# Patient Record
Sex: FEMALE | Race: OTHER | HISPANIC OR LATINO | Employment: UNEMPLOYED | URBAN - METROPOLITAN AREA
[De-identification: names, ages, dates, MRNs, and addresses within clinical notes are randomized per-mention and may not be internally consistent; named-entity substitution may affect disease eponyms.]

---

## 2017-10-21 ENCOUNTER — HOSPITAL ENCOUNTER (INPATIENT)
Facility: HOSPITAL | Age: 82
LOS: 6 days | Discharge: NURSING, REHAB OR LONG-TERM CARE | DRG: 372 | End: 2017-10-27
Attending: EMERGENCY MEDICINE | Admitting: INTERNAL MEDICINE
Payer: MEDICARE

## 2017-10-21 ENCOUNTER — APPOINTMENT (INPATIENT)
Dept: RADIOLOGY | Facility: HOSPITAL | Age: 82
DRG: 372 | End: 2017-10-21
Payer: MEDICARE

## 2017-10-21 DIAGNOSIS — K92.2 GI BLEED: Primary | ICD-10-CM

## 2017-10-21 DIAGNOSIS — K52.9 COLITIS: ICD-10-CM

## 2017-10-21 DIAGNOSIS — F03.90 DEMENTIA WITHOUT BEHAVIORAL DISTURBANCE, UNSPECIFIED DEMENTIA TYPE (HCC): Chronic | ICD-10-CM

## 2017-10-21 DIAGNOSIS — K92.1 MELENA: ICD-10-CM

## 2017-10-21 DIAGNOSIS — K55.9 MESENTERIC ISCHEMIA (HCC): ICD-10-CM

## 2017-10-21 PROBLEM — R73.9 HYPERGLYCEMIA: Status: ACTIVE | Noted: 2017-10-21

## 2017-10-21 PROBLEM — D72.829 LEUKOCYTOSIS: Status: ACTIVE | Noted: 2017-10-21

## 2017-10-21 PROBLEM — I10 ESSENTIAL HYPERTENSION: Chronic | Status: ACTIVE | Noted: 2017-10-21

## 2017-10-21 PROBLEM — R33.9 URINARY RETENTION: Status: ACTIVE | Noted: 2017-10-21

## 2017-10-21 PROBLEM — R55 SYNCOPE: Status: ACTIVE | Noted: 2017-10-21

## 2017-10-21 PROBLEM — D64.9 ANEMIA: Status: ACTIVE | Noted: 2017-10-21

## 2017-10-21 PROBLEM — E87.2 LACTIC ACID ACIDOSIS: Status: ACTIVE | Noted: 2017-10-21

## 2017-10-21 PROBLEM — E03.9 HYPOTHYROIDISM: Chronic | Status: ACTIVE | Noted: 2017-10-21

## 2017-10-21 LAB
ABO GROUP BLD: NORMAL
ALBUMIN SERPL BCP-MCNC: 3.7 G/DL (ref 3.5–5)
ALP SERPL-CCNC: 106 U/L (ref 46–116)
ALT SERPL W P-5'-P-CCNC: 38 U/L (ref 12–78)
ANION GAP BLD CALC-SCNC: 13 MMOL/L (ref 4–13)
ANION GAP SERPL CALCULATED.3IONS-SCNC: 11 MMOL/L (ref 4–13)
ANISOCYTOSIS BLD QL SMEAR: PRESENT
AST SERPL W P-5'-P-CCNC: 33 U/L (ref 5–45)
ATRIAL RATE: 63 BPM
ATRIAL RATE: 65 BPM
BASE EXCESS BLDA CALC-SCNC: -3 MMOL/L (ref -2–3)
BASOPHILS # BLD AUTO: 0.01 THOUSANDS/ΜL (ref 0–0.1)
BASOPHILS # BLD MANUAL: 0 THOUSAND/UL (ref 0–0.1)
BASOPHILS NFR BLD AUTO: 0 % (ref 0–1)
BASOPHILS NFR MAR MANUAL: 0 % (ref 0–1)
BILIRUB SERPL-MCNC: 0.52 MG/DL (ref 0.2–1)
BLD GP AB SCN SERPL QL: NEGATIVE
BUN BLD-MCNC: 23 MG/DL (ref 5–25)
BUN SERPL-MCNC: 18 MG/DL (ref 5–25)
CA-I BLD-SCNC: 1.13 MMOL/L (ref 1.12–1.32)
CA-I BLD-SCNC: 1.15 MMOL/L (ref 1.12–1.32)
CALCIUM SERPL-MCNC: 8.8 MG/DL (ref 8.3–10.1)
CHLORIDE BLD-SCNC: 99 MMOL/L (ref 100–108)
CHLORIDE SERPL-SCNC: 99 MMOL/L (ref 100–108)
CO2 SERPL-SCNC: 22 MMOL/L (ref 21–32)
CREAT BLD-MCNC: 0.8 MG/DL (ref 0.6–1.3)
CREAT SERPL-MCNC: 1 MG/DL (ref 0.6–1.3)
EOSINOPHIL # BLD AUTO: 0.03 THOUSAND/ΜL (ref 0–0.61)
EOSINOPHIL # BLD MANUAL: 0 THOUSAND/UL (ref 0–0.4)
EOSINOPHIL NFR BLD AUTO: 0 % (ref 0–6)
EOSINOPHIL NFR BLD MANUAL: 0 % (ref 0–6)
ERYTHROCYTE [DISTWIDTH] IN BLOOD BY AUTOMATED COUNT: 13.3 % (ref 11.6–15.1)
ERYTHROCYTE [DISTWIDTH] IN BLOOD BY AUTOMATED COUNT: 13.3 % (ref 11.6–15.1)
GFR SERPL CREATININE-BSD FRML MDRD: 53 ML/MIN/1.73SQ M
GFR SERPL CREATININE-BSD FRML MDRD: 69 ML/MIN/1.73SQ M
GLUCOSE SERPL-MCNC: 223 MG/DL (ref 65–140)
GLUCOSE SERPL-MCNC: 231 MG/DL (ref 65–140)
GLUCOSE SERPL-MCNC: 233 MG/DL (ref 65–140)
HCO3 BLDA-SCNC: 22.3 MMOL/L (ref 24–30)
HCT VFR BLD AUTO: 33.6 % (ref 34.8–46.1)
HCT VFR BLD AUTO: 37.9 % (ref 34.8–46.1)
HCT VFR BLD AUTO: 39.1 % (ref 34.8–46.1)
HCT VFR BLD CALC: 38 % (ref 34.8–46.1)
HCT VFR BLD CALC: 40 % (ref 34.8–46.1)
HGB BLD-MCNC: 11.3 G/DL (ref 11.5–15.4)
HGB BLD-MCNC: 12.8 G/DL (ref 11.5–15.4)
HGB BLD-MCNC: 13.4 G/DL (ref 11.5–15.4)
HGB BLDA-MCNC: 12.9 G/DL (ref 11.5–15.4)
HGB BLDA-MCNC: 13.6 G/DL (ref 11.5–15.4)
LACTATE SERPL-SCNC: 2.8 MMOL/L (ref 0.5–2)
LACTATE SERPL-SCNC: 3.7 MMOL/L (ref 0.5–2)
LYMPHOCYTES # BLD AUTO: 0.67 THOUSAND/UL (ref 0.6–4.47)
LYMPHOCYTES # BLD AUTO: 1.99 THOUSANDS/ΜL (ref 0.6–4.47)
LYMPHOCYTES # BLD AUTO: 3 % (ref 14–44)
LYMPHOCYTES NFR BLD AUTO: 12 % (ref 14–44)
MACROCYTES BLD QL AUTO: PRESENT
MCH RBC QN AUTO: 31.6 PG (ref 26.8–34.3)
MCH RBC QN AUTO: 31.8 PG (ref 26.8–34.3)
MCHC RBC AUTO-ENTMCNC: 33.6 G/DL (ref 31.4–37.4)
MCHC RBC AUTO-ENTMCNC: 33.8 G/DL (ref 31.4–37.4)
MCV RBC AUTO: 94 FL (ref 82–98)
MCV RBC AUTO: 94 FL (ref 82–98)
MONOCYTES # BLD AUTO: 1.35 THOUSAND/ΜL (ref 0.17–1.22)
MONOCYTES # BLD AUTO: 1.57 THOUSAND/UL (ref 0–1.22)
MONOCYTES NFR BLD AUTO: 8 % (ref 4–12)
MONOCYTES NFR BLD: 7 % (ref 4–12)
NEUTROPHILS # BLD AUTO: 12.87 THOUSANDS/ΜL (ref 1.85–7.62)
NEUTROPHILS # BLD MANUAL: 19.76 THOUSAND/UL (ref 1.85–7.62)
NEUTS SEG NFR BLD AUTO: 80 % (ref 43–75)
NEUTS SEG NFR BLD AUTO: 88 % (ref 43–75)
NRBC BLD AUTO-RTO: 0 /100 WBCS
NRBC BLD AUTO-RTO: 0 /100 WBCS
P AXIS: 63 DEGREES
P AXIS: 79 DEGREES
PCO2 BLD: 24 MMOL/L (ref 21–32)
PCO2 BLD: 24 MMOL/L (ref 21–32)
PCO2 BLD: 41.1 MM HG (ref 42–50)
PH BLD: 7.34 [PH] (ref 7.3–7.4)
PLATELET # BLD AUTO: 251 THOUSANDS/UL (ref 149–390)
PLATELET # BLD AUTO: 311 THOUSANDS/UL (ref 149–390)
PLATELET BLD QL SMEAR: ADEQUATE
PMV BLD AUTO: 8.8 FL (ref 8.9–12.7)
PMV BLD AUTO: 9.2 FL (ref 8.9–12.7)
PO2 BLD: 196 MM HG (ref 35–45)
POIKILOCYTOSIS BLD QL SMEAR: PRESENT
POTASSIUM BLD-SCNC: 3.7 MMOL/L (ref 3.5–5.3)
POTASSIUM BLD-SCNC: 3.8 MMOL/L (ref 3.5–5.3)
POTASSIUM SERPL-SCNC: 3.6 MMOL/L (ref 3.5–5.3)
PR INTERVAL: 166 MS
PR INTERVAL: 170 MS
PROT SERPL-MCNC: 7.7 G/DL (ref 6.4–8.2)
QRS AXIS: 71 DEGREES
QRS AXIS: 72 DEGREES
QRSD INTERVAL: 66 MS
QRSD INTERVAL: 74 MS
QT INTERVAL: 434 MS
QT INTERVAL: 434 MS
QTC INTERVAL: 444 MS
QTC INTERVAL: 451 MS
RBC # BLD AUTO: 3.58 MILLION/UL (ref 3.81–5.12)
RBC # BLD AUTO: 4.02 MILLION/UL (ref 3.81–5.12)
RBC MORPH BLD: PRESENT
RH BLD: POSITIVE
SAO2 % BLD FROM PO2: 100 % (ref 95–98)
SODIUM BLD-SCNC: 131 MMOL/L (ref 136–145)
SODIUM BLD-SCNC: 131 MMOL/L (ref 136–145)
SODIUM SERPL-SCNC: 132 MMOL/L (ref 136–145)
SPECIMEN EXPIRATION DATE: NORMAL
SPECIMEN SOURCE: ABNORMAL
SPECIMEN SOURCE: ABNORMAL
SPECIMEN SOURCE: NORMAL
T WAVE AXIS: 88 DEGREES
T WAVE AXIS: 96 DEGREES
TROPONIN I BLD-MCNC: 0 NG/ML (ref 0–0.08)
VARIANT LYMPHS # BLD AUTO: 2 %
VENTRICULAR RATE: 63 BPM
VENTRICULAR RATE: 65 BPM
WBC # BLD AUTO: 16.3 THOUSAND/UL (ref 4.31–10.16)
WBC # BLD AUTO: 22.46 THOUSAND/UL (ref 4.31–10.16)

## 2017-10-21 PROCEDURE — 85007 BL SMEAR W/DIFF WBC COUNT: CPT | Performed by: EMERGENCY MEDICINE

## 2017-10-21 PROCEDURE — 84295 ASSAY OF SERUM SODIUM: CPT

## 2017-10-21 PROCEDURE — 36415 COLL VENOUS BLD VENIPUNCTURE: CPT | Performed by: EMERGENCY MEDICINE

## 2017-10-21 PROCEDURE — 74177 CT ABD & PELVIS W/CONTRAST: CPT

## 2017-10-21 PROCEDURE — 96360 HYDRATION IV INFUSION INIT: CPT

## 2017-10-21 PROCEDURE — 93005 ELECTROCARDIOGRAM TRACING: CPT

## 2017-10-21 PROCEDURE — 86901 BLOOD TYPING SEROLOGIC RH(D): CPT | Performed by: EMERGENCY MEDICINE

## 2017-10-21 PROCEDURE — 86900 BLOOD TYPING SEROLOGIC ABO: CPT | Performed by: EMERGENCY MEDICINE

## 2017-10-21 PROCEDURE — 85025 COMPLETE CBC W/AUTO DIFF WBC: CPT | Performed by: EMERGENCY MEDICINE

## 2017-10-21 PROCEDURE — 85014 HEMATOCRIT: CPT

## 2017-10-21 PROCEDURE — 99285 EMERGENCY DEPT VISIT HI MDM: CPT

## 2017-10-21 PROCEDURE — 96361 HYDRATE IV INFUSION ADD-ON: CPT

## 2017-10-21 PROCEDURE — 86850 RBC ANTIBODY SCREEN: CPT | Performed by: EMERGENCY MEDICINE

## 2017-10-21 PROCEDURE — 82803 BLOOD GASES ANY COMBINATION: CPT

## 2017-10-21 PROCEDURE — 93005 ELECTROCARDIOGRAM TRACING: CPT | Performed by: EMERGENCY MEDICINE

## 2017-10-21 PROCEDURE — 80047 BASIC METABLC PNL IONIZED CA: CPT

## 2017-10-21 PROCEDURE — 82330 ASSAY OF CALCIUM: CPT

## 2017-10-21 PROCEDURE — 85014 HEMATOCRIT: CPT | Performed by: INTERNAL MEDICINE

## 2017-10-21 PROCEDURE — 85018 HEMOGLOBIN: CPT | Performed by: INTERNAL MEDICINE

## 2017-10-21 PROCEDURE — C9113 INJ PANTOPRAZOLE SODIUM, VIA: HCPCS | Performed by: EMERGENCY MEDICINE

## 2017-10-21 PROCEDURE — 84132 ASSAY OF SERUM POTASSIUM: CPT

## 2017-10-21 PROCEDURE — 84484 ASSAY OF TROPONIN QUANT: CPT

## 2017-10-21 PROCEDURE — 85027 COMPLETE CBC AUTOMATED: CPT | Performed by: EMERGENCY MEDICINE

## 2017-10-21 PROCEDURE — 83605 ASSAY OF LACTIC ACID: CPT | Performed by: EMERGENCY MEDICINE

## 2017-10-21 PROCEDURE — 82947 ASSAY GLUCOSE BLOOD QUANT: CPT

## 2017-10-21 PROCEDURE — 80053 COMPREHEN METABOLIC PANEL: CPT | Performed by: EMERGENCY MEDICINE

## 2017-10-21 RX ORDER — AMLODIPINE BESYLATE 5 MG/1
5 TABLET ORAL DAILY
COMMUNITY

## 2017-10-21 RX ORDER — ONDANSETRON 2 MG/ML
4 INJECTION INTRAMUSCULAR; INTRAVENOUS EVERY 6 HOURS PRN
Status: DISCONTINUED | OUTPATIENT
Start: 2017-10-21 | End: 2017-10-26

## 2017-10-21 RX ORDER — PANTOPRAZOLE SODIUM 40 MG/1
40 INJECTION, POWDER, FOR SOLUTION INTRAVENOUS
Status: DISCONTINUED | OUTPATIENT
Start: 2017-10-22 | End: 2017-10-26

## 2017-10-21 RX ORDER — LEVOTHYROXINE SODIUM 88 UG/1
88 TABLET ORAL
Status: DISCONTINUED | OUTPATIENT
Start: 2017-10-22 | End: 2017-10-27 | Stop reason: HOSPADM

## 2017-10-21 RX ORDER — AMLODIPINE AND VALSARTAN 5; 320 MG/1; MG/1
1 TABLET ORAL DAILY
Status: DISCONTINUED | OUTPATIENT
Start: 2017-10-22 | End: 2017-10-21 | Stop reason: CLARIF

## 2017-10-21 RX ORDER — SODIUM CHLORIDE 9 MG/ML
125 INJECTION, SOLUTION INTRAVENOUS CONTINUOUS
Status: DISCONTINUED | OUTPATIENT
Start: 2017-10-21 | End: 2017-10-26

## 2017-10-21 RX ORDER — LEVOTHYROXINE SODIUM 88 UG/1
88 TABLET ORAL DAILY
COMMUNITY

## 2017-10-21 RX ORDER — AMLODIPINE BESYLATE 5 MG/1
5 TABLET ORAL DAILY
Status: DISCONTINUED | OUTPATIENT
Start: 2017-10-22 | End: 2017-10-27 | Stop reason: HOSPADM

## 2017-10-21 RX ORDER — IRBESARTAN 300 MG/1
300 TABLET ORAL
COMMUNITY

## 2017-10-21 RX ORDER — VALSARTAN 160 MG/1
320 TABLET ORAL DAILY
Status: DISCONTINUED | OUTPATIENT
Start: 2017-10-22 | End: 2017-10-27 | Stop reason: HOSPADM

## 2017-10-21 RX ORDER — PANTOPRAZOLE SODIUM 40 MG/1
40 INJECTION, POWDER, FOR SOLUTION INTRAVENOUS ONCE
Status: DISCONTINUED | OUTPATIENT
Start: 2017-10-21 | End: 2017-10-21

## 2017-10-21 RX ADMIN — PIPERACILLIN SODIUM AND TAZOBACTAM SODIUM 3.38 G: 36; 4.5 INJECTION, POWDER, FOR SOLUTION INTRAVENOUS at 23:54

## 2017-10-21 RX ADMIN — IOHEXOL 100 ML: 350 INJECTION, SOLUTION INTRAVENOUS at 20:58

## 2017-10-21 RX ADMIN — SODIUM CHLORIDE 1000 ML: 0.9 INJECTION, SOLUTION INTRAVENOUS at 16:08

## 2017-10-21 RX ADMIN — POLYETHYLENE GLYCOL 3350, SODIUM SULFATE ANHYDROUS, SODIUM BICARBONATE, SODIUM CHLORIDE, POTASSIUM CHLORIDE 4000 ML: 236; 22.74; 6.74; 5.86; 2.97 POWDER, FOR SOLUTION ORAL at 19:20

## 2017-10-21 RX ADMIN — ONDANSETRON 4 MG: 2 INJECTION INTRAMUSCULAR; INTRAVENOUS at 20:27

## 2017-10-21 RX ADMIN — SODIUM CHLORIDE 1000 ML: 0.9 INJECTION, SOLUTION INTRAVENOUS at 18:00

## 2017-10-21 RX ADMIN — SODIUM CHLORIDE 80 MG: 9 INJECTION, SOLUTION INTRAVENOUS at 18:35

## 2017-10-21 RX ADMIN — SODIUM CHLORIDE 100 ML/HR: 0.9 INJECTION, SOLUTION INTRAVENOUS at 23:08

## 2017-10-21 RX ADMIN — SODIUM CHLORIDE 8 MG/HR: 9 INJECTION, SOLUTION INTRAVENOUS at 18:09

## 2017-10-21 RX ADMIN — SODIUM CHLORIDE, SODIUM LACTATE, POTASSIUM CHLORIDE, AND CALCIUM CHLORIDE 1000 ML: .6; .31; .03; .02 INJECTION, SOLUTION INTRAVENOUS at 23:13

## 2017-10-21 NOTE — CONSULTS
Consultation - [facility]  Bradly Dusty 80 y o  female MRN: [id number]  Unit/Bed#: ED 29 Encounter: [id number]        Consults    Reason for Consult / Principal Problem:  hematochezia    HPI:  71-year-old lady who presents to the hospital after complaining of lightheadedness dizziness and presyncopal episode at home  On the way to the hospital she felt like she was having a bowel movement  She was brought in by EMS  She was hypertensive  When she came to the emergency room she did have a large amount of hematochezia  She was given a bolus  Her blood pressure in the emergency room was 130 over 50s  Her heart rate was in the 60s to 70s  She has been on antihypertensives  She does not take any anticoagulation  She did not take any NSAIDs  She did complain of abdominal bloating and discomfort and cramping  That resolved with having a bowel movement  She has had 3 episodes of hematochezia in the hospital   She has had no nausea vomiting  Her son does note that she had an episode of coffee-ground emesis in [facility] about 6 months ago  She just came back from [facility] 2 weeks ago  Over that she also had an endoscopy and colonoscopy some point during the year  Did not recall the results  The patient is a poor historian secondary to dementia  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 ursodiol some years ago  She has not been on this recently  REVIEW OF SYSTEMS:     CONSTITUTIONAL:  Complains of weakness  HEENT: No earache or tinnitus  Denies hearing loss or visual disturbances  CARDIOVASCULAR: No chest pain or palpitations  RESPIRATORY: Denies any cough, hemoptysis, shortness of breath or dyspnea on exertion  GASTROINTESTINAL: As noted in the History of Present Illness  GENITOURINARY: No problems with urination  Denies any hematuria or dysuria  NEUROLOGIC: No dizziness or vertigo, denies headaches  MUSCULOSKELETAL: Denies any muscle or joint pain     SKIN: Denies skin rashes or itching  ENDOCRINE: Denies excessive thirst  Denies intolerance to heat or cold  PSYCHOSOCIAL: Denies depression or anxiety  Denies any recent memory loss  Historical Information   No past medical history on file  No past surgical history on file  Social History   History   Alcohol Use No     History   Drug Use No     History   Smoking Status    Never Smoker   Smokeless Tobacco    Never Used     No family history on file  Meds/Allergies       (Not in a hospital admission)  Current Facility-Administered Medications   Medication Dose Route Frequency    pantoprazole (PROTONIX) 80 mg in sodium chloride 0 9 % 100 mL infusion  8 mg/hr Intravenous Continuous    pantoprazole (PROTONIX) 80 mg in sodium chloride 0 9 % 100 mL IVPB  80 mg Intravenous Once    polyethylene glycol (GOLYTELY) bowel prep 4,000 mL  4,000 mL Oral Once    sodium chloride 0 9 % bolus 1,000 mL  1,000 mL Intravenous Once       No Known Allergies        Objective     Blood pressure 121/58, pulse 71, temperature (!) 94 4 °F (34 7 °C), temperature source Rectal, resp  rate 16, SpO2 100 %  No intake or output data in the 24 hours ending 10/21/17 1819      PHYSICAL EXAM:      General Appearance:   Alert, cooperative, no distress, appears stated age    HEENT:   Normocephalic, atraumatic, anicteric      Neck:  Supple, symmetrical, trachea midline, no adenopathy;    thyroid: no enlargement/tenderness/nodules; no carotid  bruit or JVD    Lungs:   Clear to auscultation bilaterally; no rales, rhonchi or wheezing; respirations unlabored    Heart[de-identified]   S1 and S2 normal; regular rate and rhythm; no murmur, rub, or gallop  Abdomen:   Soft, mild-tender, mild-distended; normal bowel sounds; no masses, no organomegaly    Genitalia:   Deferred    Rectal:   No external hemorrhoids  There was bright red blood on digital examination     Extremities:  No cyanosis, clubbing or edema    Pulses:  2+ and symmetric all extremities    Skin:  Skin color, texture, turgor normal, no rashes or lesions    Lymph nodes:  No palpable cervical, axillary or inguinal lymphadenopathy        Lab Results:   Admission on 10/21/2017   Component Date Value    WBC 10/21/2017 16 30*    RBC 10/21/2017 4 02     Hemoglobin 10/21/2017 12 8     Hematocrit 10/21/2017 37 9     MCV 10/21/2017 94     MCH 10/21/2017 31 8     MCHC 10/21/2017 33 8     RDW 10/21/2017 13 3     MPV 10/21/2017 9 2     Platelets 97/53/1263 311     nRBC 10/21/2017 0     Neutrophils Relative 10/21/2017 80*    Lymphocytes Relative 10/21/2017 12*    Monocytes Relative 10/21/2017 8     Eosinophils Relative 10/21/2017 0     Basophils Relative 10/21/2017 0     Neutrophils Absolute 10/21/2017 12 87*    Lymphocytes Absolute 10/21/2017 1 99     Monocytes Absolute 10/21/2017 1 35*    Eosinophils Absolute 10/21/2017 0 03     Basophils Absolute 10/21/2017 0 01     Sodium 10/21/2017 132*    Potassium 10/21/2017 3 6     Chloride 10/21/2017 99*    CO2 10/21/2017 22     Anion Gap 10/21/2017 11     BUN 10/21/2017 18     Creatinine 10/21/2017 1 00     Glucose 10/21/2017 223*    Calcium 10/21/2017 8 8     AST 10/21/2017 33     ALT 10/21/2017 38     Alkaline Phosphatase 10/21/2017 106     Total Protein 10/21/2017 7 7     Albumin 10/21/2017 3 7     Total Bilirubin 10/21/2017 0 52     eGFR 10/21/2017 53     LACTIC ACID 10/21/2017 3 7*    ABO Grouping 10/21/2017 A     Rh Factor 10/21/2017 Positive     Antibody Screen 10/21/2017 Negative     Specimen Expiration Date 10/21/2017 [id number]     ph, Taj ISTAT 10/21/2017 7 342     pCO2, Taj i-STAT 10/21/2017 41 1*    pO2, Taj i-STAT 10/21/2017 196 0*    BE, i-STAT 10/21/2017 -3*    HCO3, Taj i-STAT 10/21/2017 22 3*    CO2, i-STAT 10/21/2017 24     O2 Sat, i-STAT 10/21/2017 100*    SODIUM, I-STAT 10/21/2017 131*    Potassium, i-STAT 10/21/2017 3 7     Calcium, Ionized i-STAT 10/21/2017 1 15     Hct, i-STAT 10/21/2017 38     Hgb, i-STAT 10/21/2017 12 9     Glucose, i-STAT 10/21/2017 233*    Specimen Type 10/21/2017 VENOUS     SODIUM, I-STAT 10/21/2017 131*    Potassium, i-STAT 10/21/2017 3 8     Chloride, istat 10/21/2017 99*    CO2, i-STAT 10/21/2017 24     Anion Gap, Istat 10/21/2017 13     Calcium, Ionized i-STAT 10/21/2017 1 13     BUN, I-STAT 10/21/2017 23     Creatinine, i-STAT 10/21/2017 0 8     eGFR 10/21/2017 69     Glucose, i-STAT 10/21/2017 231*    Hct, i-STAT 10/21/2017 40     Hgb, i-STAT 10/21/2017 13 6     Specimen Type 10/21/2017 VENOUS     POC Troponin I 10/21/2017 0 00     Specimen Type 10/21/2017 VENOUS        Imaging Studies: I have personally reviewed pertinent imaging studies  No results found  ASSESSMENT/ PLAN:      1  Gastrointestinal hemorrhage with hematochezia  Possible sources are lower GI bleeding including diverticulosis, AVMs or mass lesion  I there is a possibility of upper GI bleed especially view to her hypotensive episode  This could include peptic ulcer disease or AVMs  Her BUN was normal   She did have an episode of coffee-ground emesis a few months ago  I do not have the results from the endoscopy that was done around that time  At this time I would recommend the following  -start Protonix drip   -her hemoglobin level on presented to the emergency room was normal   However she has been hydrated and I am sure she would be at a lower level  repeat hemoglobin levels   -transfuse as needed   -I would like to give her at least half a GoLYTELY prep in the next 2 hours to clear out the colon as much as possible  If she remains stable overnight she will continue the prep for a scope tomorrow or Monday  If she has any further episodes of hypotension or the hemoglobin drops significantly then we will plan to do an endoscopy and possible colonoscopy today  I discussed this with the team and with the patient's son    -recommend close it follow-up in step down   -hold off on CT bleeding protocol at this time since we are planning on doing a colonoscopy if the bleeding were to increase

## 2017-10-21 NOTE — ED PROVIDER NOTES
History  Chief Complaint   Patient presents with    Dizziness     pt became dizzy, lightheaded, near sycopal  FSBS 172  Pt hypotensive prehospital, given 500 ml NSS bolus     Has history of hypertension comes in for evaluation of feeling presyncopal   Was fine this morning got abdominal cramping when she was out with family; had a large bloody bowel movement  Had another bloody bowel movement here  Patient was per EMS hypotensive given 500 mL fluid and pressure responded  Here in the department blood pressure is 110/53  Patient is having dark blood per rectum; running out  Has never had this before  Has had colonoscopy in the past   She states colonoscopy was unremarkable patient speaks Tristanian son at bedside to interpret  Did also states she has chest pain in the middle of her chest; has been going on for the past hour or 2  She has not had this before  No falls trauma; or accidents  No nausea vomiting  On rectal exam; melanotic dark watery stool leaking out  No abdominal tenderness  Rest of labs essentially unremarkable  Patient's GCS 15  Type and screen 2 IVs IV fluids lactic abdominal pain labs  Hx HTN and Hypothyroid            Prior to Admission Medications   Prescriptions Last Dose Informant Patient Reported? Taking? amLODIPine (NORVASC) 5 mg tablet   Yes Yes   Sig: Take 5 mg by mouth daily   irbesartan (AVAPRO) 300 mg tablet   Yes Yes   Sig: Take 300 mg by mouth daily at bedtime   levothyroxine (SYNTHROID) 88 mcg tablet   Yes Yes   Sig: Take 88 mcg by mouth daily      Facility-Administered Medications: None       Past Medical History:   Diagnosis Date    Disease of thyroid gland     Hypertension        History reviewed  No pertinent surgical history  Family History   Problem Relation Age of Onset    No Known Problems Mother     No Known Problems Father      I have reviewed and agree with the history as documented      Social History   Substance Use Topics    Smoking status: Never Smoker    Smokeless tobacco: Never Used    Alcohol use No        Review of Systems   Constitutional: Positive for activity change  Negative for appetite change, chills and fever  HENT: Negative for congestion, ear pain, rhinorrhea, sore throat and trouble swallowing  Eyes: Negative for photophobia and pain  Respiratory: Negative for cough, chest tightness, shortness of breath and wheezing  Cardiovascular: Negative for chest pain and palpitations  Gastrointestinal: Positive for abdominal pain, blood in stool and diarrhea  Negative for abdominal distention, constipation, nausea and vomiting  Genitourinary: Negative for dysuria, flank pain, hematuria and urgency  Musculoskeletal: Negative for arthralgias, back pain, joint swelling, neck pain and neck stiffness  Skin: Negative for color change, pallor and rash  Neurological: Positive for light-headedness  Negative for dizziness, seizures, speech difficulty, weakness, numbness and headaches  Hematological: Negative for adenopathy  Psychiatric/Behavioral: Negative for confusion, hallucinations and self-injury  Physical Exam  ED Triage Vitals   Temperature Pulse Respirations Blood Pressure SpO2   10/21/17 1723 10/21/17 1621 10/21/17 1621 10/21/17 1535 10/21/17 1556   (!) 94 4 °F (34 7 °C) 64 18 131/61 99 %      Temp Source Heart Rate Source Patient Position - Orthostatic VS BP Location FiO2 (%)   10/21/17 1723 10/21/17 1621 10/21/17 1621 10/21/17 1621 --   Rectal Monitor Lying Right arm       Pain Score       10/21/17 1621       5           Physical Exam   Constitutional: She is oriented to person, place, and time  She appears well-developed and well-nourished  No distress  HENT:   Head: Normocephalic and atraumatic  Right Ear: External ear normal    Left Ear: External ear normal    Mouth/Throat: Oropharynx is clear and moist  No oropharyngeal exudate     Eyes: Conjunctivae and EOM are normal  Pupils are equal, round, and reactive to light  Right eye exhibits no discharge  Left eye exhibits no discharge  Neck: Normal range of motion  Neck supple  No tracheal deviation present  No thyromegaly present  Cardiovascular: Normal rate, normal heart sounds and intact distal pulses  No murmur heard  Pulmonary/Chest: Effort normal and breath sounds normal  No respiratory distress  She has no wheezes  She has no rales  Abdominal: Soft  Bowel sounds are normal  She exhibits no distension  There is no tenderness  There is no rebound and no guarding  Nontender x4   Genitourinary: Rectal exam shows guaiac positive stool  Genitourinary Comments: Large amount of melanotic liquid stool running out of her rectum   Musculoskeletal: Normal range of motion  She exhibits no edema or deformity  Lymphadenopathy:     She has no cervical adenopathy  Neurological: She is alert and oriented to person, place, and time  No cranial nerve deficit  She exhibits normal muscle tone  Skin: Skin is warm  Capillary refill takes less than 2 seconds  No rash noted  She is not diaphoretic  No erythema  No pallor  Psychiatric: She has a normal mood and affect   Her behavior is normal  Judgment and thought content normal        ED Medications  Medications   levothyroxine tablet 88 mcg (88 mcg Oral Given 10/22/17 0538)   ondansetron (ZOFRAN) injection 4 mg (4 mg Intravenous Given 10/21/17 2027)   amLODIPine (NORVASC) tablet 5 mg (5 mg Oral Given 10/22/17 0820)     And   valsartan (DIOVAN) tablet 320 mg (320 mg Oral Given 10/22/17 0820)   piperacillin-tazobactam (ZOSYN) 3 375 g in sodium chloride 0 9 % 50 mL IVPB (3 375 g Intravenous New Bag 10/22/17 0503)   sodium chloride 0 9 % infusion (125 mL/hr Intravenous Rate/Dose Change 10/22/17 0607)   pantoprazole (PROTONIX) injection 40 mg (40 mg Intravenous Given 10/22/17 0820)   sodium chloride 0 9 % bolus 1,000 mL (1,000 mL Intravenous New Bag 10/21/17 1608)   pantoprazole (PROTONIX) 80 mg in sodium chloride 0 9 % 100 mL IVPB (0 mg Intravenous Stopped 10/21/17 2313)   sodium chloride 0 9 % bolus 1,000 mL (1,000 mL Intravenous New Bag 10/21/17 1800)   polyethylene glycol (GOLYTELY) bowel prep 4,000 mL (4,000 mL Oral Given 10/21/17 1920)   iohexol (OMNIPAQUE) 350 MG/ML injection (MULTI-DOSE) 100 mL (100 mL Intravenous Given 10/21/17 2058)   lactated ringers bolus 1,000 mL (0 mL Intravenous Stopped 10/22/17 0200)   lactated ringers bolus 1,000 mL (0 mL Intravenous Stopped 10/22/17 0755)       Diagnostic Studies  Labs Reviewed   CBC AND DIFFERENTIAL - Abnormal        Result Value Ref Range Status    WBC 16 30 (*) 4 31 - 10 16 Thousand/uL Final    Neutrophils Relative 80 (*) 43 - 75 % Final    Lymphocytes Relative 12 (*) 14 - 44 % Final    Neutrophils Absolute 12 87 (*) 1 85 - 7 62 Thousands/µL Final    Monocytes Absolute 1 35 (*) 0 17 - 1 22 Thousand/µL Final    RBC 4 02  3 81 - 5 12 Million/uL Final    Hemoglobin 12 8  11 5 - 15 4 g/dL Final    Hematocrit 37 9  34 8 - 46 1 % Final    MCV 94  82 - 98 fL Final    MCH 31 8  26 8 - 34 3 pg Final    MCHC 33 8  31 4 - 37 4 g/dL Final    RDW 13 3  11 6 - 15 1 % Final    MPV 9 2  8 9 - 12 7 fL Final    Platelets 480  948 - 390 Thousands/uL Final    nRBC 0  /100 WBCs Final    Monocytes Relative 8  4 - 12 % Final    Eosinophils Relative 0  0 - 6 % Final    Basophils Relative 0  0 - 1 % Final    Lymphocytes Absolute 1 99  0 60 - 4 47 Thousands/µL Final    Eosinophils Absolute 0 03  0 00 - 0 61 Thousand/µL Final    Basophils Absolute 0 01  0 00 - 0 10 Thousands/µL Final   COMPREHENSIVE METABOLIC PANEL - Abnormal     Sodium 132 (*) 136 - 145 mmol/L Final    Chloride 99 (*) 100 - 108 mmol/L Final    Glucose 223 (*) 65 - 140 mg/dL Final    Comment:   If the patient is fasting, the ADA then defines impaired fasting glucose as > 100 mg/dL and diabetes as > or equal to 123 mg/dL  Specimen collection should occur prior to Sulfasalazine administration due to the potential for falsely depressed results  Specimen collection should occur prior to Sulfapyridine administration due to the potential for falsely elevated results  Potassium 3 6  3 5 - 5 3 mmol/L Final    Comment: Slightly Hemolyzed; Results May be Affected    CO2 22  21 - 32 mmol/L Final    Anion Gap 11  4 - 13 mmol/L Final    BUN 18  5 - 25 mg/dL Final    Creatinine 1 00  0 60 - 1 30 mg/dL Final    Comment: Standardized to IDMS reference method    Calcium 8 8  8 3 - 10 1 mg/dL Final    AST 33  5 - 45 U/L Final    Comment: Slightly Hemolyzed; Results May be Affected  Specimen collection should occur prior to Sulfasalazine administration due to the potential for falsely depressed results  ALT 38  12 - 78 U/L Final    Comment:   Specimen collection should occur prior to Sulfasalazine and/or Sulfapyridine administration due to the potential for falsely depressed results  Alkaline Phosphatase 106  46 - 116 U/L Final    Total Protein 7 7  6 4 - 8 2 g/dL Final    Albumin 3 7  3 5 - 5 0 g/dL Final    Total Bilirubin 0 52  0 20 - 1 00 mg/dL Final    eGFR 53  ml/min/1 73sq m Final    Narrative:     National Kidney Disease Education Program recommendations are as follows:  GFR calculation is accurate only with a steady state creatinine  Chronic Kidney disease less than 60 ml/min/1 73 sq  meters  Kidney failure less than 15 ml/min/1 73 sq  meters  LACTIC ACID, PLASMA - Abnormal     LACTIC ACID 3 7 (*) 0 5 - 2 0 mmol/L Final    Narrative:     Result may be elevated if tourniquet was used during collection  LACTIC ACID, PLASMA - Abnormal     LACTIC ACID 2 8 (*) 0 5 - 2 0 mmol/L Final    Narrative:     Result may be elevated if tourniquet was used during collection     POCT BLOOD GAS (CG8+) - Abnormal     pCO2, Taj i-STAT 41 1 (*) 42 0 - 50 0 mm HG Final    pO2, Taj i-STAT 196 0 (*) 35 0 - 45 0 mm HG Final    BE, i-STAT -3 (*) -2 - 3 mmol/L Final    HCO3, Taj i-STAT 22 3 (*) 24 0 - 30 0 mmol/L Final    O2 Sat, i-STAT 100 (*) 95 - 98 % Final    SODIUM, I-STAT 131 (*) 136 - 145 mmol/l Final    Glucose, i-STAT 233 (*) 65 - 140 mg/dl Final    ph, Taj ISTAT 7 342  7 300 - 7 400 Final    CO2, i-STAT 24  21 - 32 mmol/L Final    Potassium, i-STAT 3 7  3 5 - 5 3 mmol/L Final    Calcium, Ionized i-STAT 1 15  1 12 - 1 32 mmol/L Final    Hct, i-STAT 38  34 8 - 46 1 % Final    Hgb, i-STAT 12 9  11 5 - 15 4 g/dl Final    Specimen Type VENOUS   Final   POCT CHEM 8+ - Abnormal     SODIUM, I-STAT 131 (*) 136 - 145 mmol/l Final    Chloride, istat 99 (*) 100 - 108 mmol/L Final    Glucose, i-STAT 231 (*) 65 - 140 mg/dl Final    Potassium, i-STAT 3 8  3 5 - 5 3 mmol/L Final    CO2, i-STAT 24  21 - 32 mmol/L Final    Anion Gap, Istat 13  4 - 13 mmol/L Final    Calcium, Ionized i-STAT 1 13  1 12 - 1 32 mmol/L Final    BUN, I-STAT 23  5 - 25 mg/dl Final    Creatinine, i-STAT 0 8  0 6 - 1 3 mg/dl Final    eGFR 69  ml/min/1 73sq m Final    Hct, i-STAT 40  34 8 - 46 1 % Final    Hgb, i-STAT 13 6  11 5 - 15 4 g/dl Final    Specimen Type VENOUS   Final   POCT TROPONIN - Normal    POC Troponin I 0 00  0 00 - 0 08 ng/ml Final    Specimen Type VENOUS   Final    Narrative:     Abbott i-Stat handheld analyzer 99% cutoff is > 0 08ng/mL in [facility] Emergency Departments    o cTnI 99% cutoff is useful only when applied to patients in the clinical setting of myocardial ischemia  o cTnI 99% cutoff should be interpreted in the context of clinical history, ECG findings and possibly cardiac imaging to establish correct diagnosis  o cTnI 99% cutoff may be suggestive but clearly not indicative of a coronary event without the clinical setting of myocardial ischemia     HEMOGLOBIN AND HEMATOCRIT, BLOOD   TYPE AND SCREEN    ABO Grouping A   Final    Rh Factor Positive   Final    Antibody Screen Negative   Final    Specimen Expiration Date [id number]   Final       CT abdomen pelvis w contrast   Final Result      There is significant long segment colonic wall thickening from the distal transverse colon to the anus   Infectious or inflammatory colitis versus ischemic bowel to be considered  Appropriate clinical and lab work up suggested  There is also patchy diminished enhancement within both kidneys concerning for possible pyelonephritis  Correlate with urinalysis  There is a tiny gas bubble in the bladder lumen which might also indicate infection  ##imslh##imslh         Workstation performed: [id number]             Procedures  ECG 12 Lead Documentation  Date/Time: 10/21/2017 4:49 PM  Performed by: Manuel Mendes  Authorized by: Dedra Frausto     Indications / Diagnosis:  Cp  ECG reviewed by me, the ED Provider: yes    Patient location:  ED  Previous ECG:     Previous ECG:  Unavailable  Interpretation:     Interpretation: non-specific    Rate:     ECG rate:  65    ECG rate assessment: normal    Rhythm:     Rhythm: sinus rhythm    Ectopy:     Ectopy: none    QRS:     QRS axis:  Normal    QRS intervals:  Normal  Conduction:     Conduction: normal    ST segments:     ST segments:  Normal  T waves:     T waves: normal            Phone Consults  ED Phone Contact    ED Course  ED Course as of Oct 22 0846   Sat Oct 21, 2017   1619 Dr Irene Rich    1634 Base Exc: (!) -3   1634 Small base deficit HCO3, Taj i-STAT: (!) 22 3   1635 Spoke with Dr Yee Ham from GI once patient get Protonix does not want GI bleed studies does not think it will affect management  Wants CBC repeated wants to be called with any tachycardia or hypotension  [address] with lab lactic 3 7    1752 Will repeat LACTIC ACID: (!!) 3 7           Identification of Seniors at [facility] Most Recent Value   (ISAR) Identification of Seniors at Risk   Before the illness or injury that brought you to the Emergency, did you need someone to help you on a regular basis?   0 Filed at: 10/21/2017 1516   In the last 24 hours, have you needed more help than usual?  0 Filed at: 10/21/2017 1516   Have you been hospitalized for one or more nights during the past 6 months? 0 Filed at: 10/21/2017 1516   In general, do you see well?  0 Filed at: 10/21/2017 1516   In general, do you have serious problems with your memory? 0 Filed at: 10/21/2017 1516   Do you take more than three different medications every day?  0 Filed at: 10/21/2017 1516   ISAR Score  0 Filed at: 10/21/2017 1516                          MDM  Number of Diagnoses or Management Options  GI bleed:   Melena:   Diagnosis management comments: Patient apparently hypotensive in the field responded to fluids  Here in department is stable vital signs are unremarkable there is no tachycardia or tachypnea her she has normal tentative  Hemoglobin was checked twice in the department small drop may be to dilution due to 3 L of IV fluid given  Two IVs were immediately obtained IV fluids were given  GI was consulted  They saw the patient in the department and patient was started on GoLYTELY for likely colonoscopy in the morning  Lactic acid was elevated to 4 0 do not believe this represents infectious etiology such as sepsis therefore antibiotics were not started  Believe this is likely due to shift and volume status and therefore aggressive fluid hydration and resuscitation was done  Remainder of labs unremarkable  EKG and troponin unremarkable  Patient admitted to step-down floor  CritCare Time    Disposition  Final diagnoses:   GI bleed   Melena     ED Disposition     ED Disposition Condition Comment    Admit  Case was discussed with SOD and the patient's admission status was agreed to be Admission Status: inpatient status to the service of Dr Jimi Moran           Follow-up Information    None       Current Discharge Medication List      CONTINUE these medications which have NOT CHANGED    Details   amLODIPine (NORVASC) 5 mg tablet Take 5 mg by mouth daily      irbesartan (AVAPRO) 300 mg tablet Take 300 mg by mouth daily at bedtime      levothyroxine (SYNTHROID) 88 mcg tablet Take 88 mcg by mouth daily           No discharge procedures on file  ED Provider  Attending physically available and evaluated Karen Stephen I managed the patient along with the ED Attending      Electronically Signed by       Stanton Lozano DO  Resident  10/22/17 9178

## 2017-10-21 NOTE — ED NOTES
Made 3 phone calls requesting protonix bolus and gtt from pharmacy  Finally received gtt not bolus  Called pharmacy and was told both bags were sent to me  I told them I only got one bag  Told they would re-send       Sydney Monae, RN  10/21/17 9406

## 2017-10-21 NOTE — ED NOTES
Attempting to take the pt upstairs, pt given the first cup of GoLyte  Pt vomiting and not tolerating GoLyte  Paged SOD        Fani Sanchez RN  10/21/17 1087

## 2017-10-21 NOTE — ED RE-EVALUATION NOTE
Patient just received bed up stairs on step-down floor  She remains hemodynamically stable in the room  Lactic acid was elevated 3 7 is being repeated  Continues to have small amount of oozing of blood per rectum initial hemoglobin was stable will send off a 2nd CBC to further evaluate after equalization throughout blood  GI is in room a evaluating patient at this time  Will start on GI prep  Explained myself and primary team that with any hypotension or persistent tachycardia to call him and they will perform scope more urgently  Before going upstairs patient was bladder scanned with 800 mL of urine and nursing states the patient is unable to urinate on her own  No indication for Coker catheter will perform straight cath at this time
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
## 2017-10-21 NOTE — H&P
[address]    History and Physical - Internal Medicine SOD-B   Hattie Watts 80 y o  female MRN: [id number]  Unit/Bed#: ED 29 Encounter: [id number]    Code Status: DNR/DNI  POA:      Reason for Admission / Principal Problem:  Lightheadedness    HPI: Hattie Watts is a 80 y o  female with past medical history significant for hypertension hypothyroidism primary biliary cirrhosis dementia  Patient was visiting his son here in [facility], she has been here for about 2 weeks, and today she started feeling lightheadedness which is something new for her, at some point she also was weak and fatigue  Symptoms were persistent, son was concerned called the ambulance, patient arrived to the emergency room, and requested to go to the bathroom, while there she noticed she has a  bloody bowel movement, red blood per rectum  GI was consulted from the ED, Dr Sabine East saw the patient and recommended to start a bowel prep for possible colonoscopy later on, and reassess H&H  At this Point patient's vitals are stable  Patient denies any longer having lightheadedness, denies also shortness of breath chest pain abdominal pain, admit not been able to void, and this is something new for her     History obtained from patient    PMH:  Past Medical History:   Diagnosis Date    Disease of thyroid gland     Hypertension       [unfilled]    Family History:   Family History   Problem Relation Age of Onset    No Known Problems Mother     No Known Problems Father        Social History:   History   Smoking Status    Never Smoker   Smokeless Tobacco    Never Used      History   Alcohol Use No      History   Drug Use No        ROS:     GENERAL:   HEENT: Denies double or blurry vision, no headache or lightheadedness   RESPIRATORY: no shortness of breath, cough or wheezing  CARDIOVASCULAR: denies chest pain or palpitations  GI: no abdominal pain, N/V or diarrhea, no brittni  : no burning or frequency, no hematuria  NEURO: no numbness or tingling  PSYCH: denies SI/HI  SKIN: denies rashes or new lesions    Allergies: No Known Allergies    Home Medications:   Prior to Admission medications    Medication Sig Start Date End Date Taking? Authorizing Provider   AMLODIPINE BESYLATE-VALSARTAN PO Take 5 mg by mouth daily   Yes Historical Provider, MD   irbesartan (AVAPRO) 300 mg tablet Take 300 mg by mouth daily at bedtime   Yes Historical Provider, MD   levothyroxine (SYNTHROID) 88 mcg tablet Take 88 mcg by mouth daily   Yes Historical Provider, MD       Invasive lines and devices: Invasive Devices     Peripheral Intravenous Line            Peripheral IV 10/21/17 Left Antecubital less than 1 day    Peripheral IV 10/21/17 Right Antecubital less than 1 day                Vitals:   Vitals:    10/21/17 1804 10/21/17 1834 10/21/17 1904 10/21/17 1921   BP: 124/59 138/65 124/59    Pulse: 102 86 88    Resp:       Temp:    (!) 95 7 °F (35 4 °C)   TempSrc:       SpO2: 95% 97% 96%      Temp  Min: 94 4 °F (34 7 °C)  Max: 95 7 °F (35 4 °C)     There is no height or weight on file to calculate BMI  Physical Exam:    Constitutional: Appears is not acute distress  HEENT: Normocephalic and atraumatic  Eyes: PEARRL, No scleral icterus  Neck: Neck supple, trachea midline, no JVD  Cardiovascular: RRR, no murmur, rub or gallop  Respiratory: CTA B/L, no rales, rhonci or wheezes  Abdominal: (+)BS, soft, not distended, non-tender  Musculoskeletal: 2+ radial equal B/L, DP 2+ and equal B/L   Neurological: Patient is alert and oriented to person, place, and time  No focal deficits  Skin: Skin is warm and dry  No obvious lesions  Psychiatric: Appropriate mood and affect         Labs:     Results from last 7 days  Lab Units 10/21/17  1817 10/21/17  1632 10/21/17  1623 10/21/17  1547   WBC Thousand/uL 22 46*  --   --  16 30*   HEMOGLOBIN g/dL 11 3*  --   --  12 8   I STAT HEMOGLOBIN g/dl --  13 6 12 9  --    HEMATOCRIT % 33 6*  --   --  37 9   PLATELETS Thousands/uL 251  --   --  311   NEUTROS PCT %  --   --   --  80*   MONOS PCT %  --   --   --  8       Results from last 7 days  Lab Units 10/21/17  1632 10/21/17  1623 10/21/17  1547   SODIUM mmol/L  --   --  132*   POTASSIUM mmol/L  --   --  3 6   CHLORIDE mmol/L  --   --  99*   CO2 mmol/L  --   --  22   BUN mg/dL  --   --  18   CREATININE mg/dL  --   --  1 00   CALCIUM mg/dL  --   --  8 8   TOTAL PROTEIN g/dL  --   --  7 7   BILIRUBIN TOTAL mg/dL  --   --  0 52   ALK PHOS U/L  --   --  106   ALT U/L  --   --  38   AST U/L  --   --  33   GLUCOSE RANDOM mg/dL  --   --  223*   GLUCOSE, ISTAT mg/dl 231* 233*  --          No results found for: PHOS       No results found for: TROPONINT  ABG:No results found for: PHART, OOS3KGP, PO2ART, QTI6LCI, Y9MCIUHV, BEART, SOURCE    Imaging: I have personally reviewed pertinent reports  EKG: This was personally reviewed by myself  Micro:  Blood Culture: No results found for: BLOODCX  Urine Culture: No results found for: URINECX  Sputum Culture: No components found for: SPUTUMCX  Wound Culure: No results found for: WOUNDCULT    A/P:  Patient Active Problem List   Diagnosis    GI bleed  Hemoglobin stable 12 8 was around 4:00 p m  Will repeat H&H q6  Patient's vitals looks stable at this time, will monitor  Will continue PPI drip  Will continue bowel prep, for possible colonoscopy later on  Keep NPO, likely EGD to be scheduled  GI consulted:  Dr Hyun Mullins will reassess patient and next H&H he may consider to do a colonoscopy in an Urgent basis depending on assessment    Call Dr Joanne Dawson if any signs of active bleeding at any time      Leukocytosis  Likely reactive  Will monitor      Anemia, acute  Hemoglobin 12 8  Will monitor      Hyperglycemia  Blood sugar on admission is 223      Hypertension  Blood pressure is 121/58  Will continue to monitor  Patient's current medication include amlodipine 5 mg daily and Irbesartan 300 mg at bedtime      Hypothyroidism  Will get a TSH  The meantime continue with levothyroxine 88 mcg daily      Urinary retention   Patient complained on the ED unable to void  Bladder scan 900 cc, straight cath x1      Dementia  AAOx3 at this time, although the son states she has good days and bad days  And historically she had refused any medication for dementia      Lactic acid acidosis  Lactic acid 3 7 on admission  Will trend               VTE Pharmacologic Prophylaxis: Sequential compression device (Venodyne)   VTE Mechanical Prophylaxis: sequential compression device    Disposition:   Admitted to [facility]   Inpatient > 2 midnights  Mima Kam DO  10/21/2017 7:23 PM

## 2017-10-21 NOTE — ED NOTES
Spoke with a staff member of the pharmacy who delivered and med to the department and asked for protonix bolus  Was also told by her the bolus and the gtt came up in the same pneumatic tube  I assured her it didn't  She said she will get a new one and hand deliver it to me       Faith Chavez, RN  10/21/17 8929

## 2017-10-22 LAB
ALBUMIN SERPL BCP-MCNC: 3.3 G/DL (ref 3.5–5)
ALP SERPL-CCNC: 104 U/L (ref 46–116)
ALT SERPL W P-5'-P-CCNC: 126 U/L (ref 12–78)
ANION GAP SERPL CALCULATED.3IONS-SCNC: 12 MMOL/L (ref 4–13)
AST SERPL W P-5'-P-CCNC: 77 U/L (ref 5–45)
BILIRUB SERPL-MCNC: 0.88 MG/DL (ref 0.2–1)
BILIRUB UR QL STRIP: NEGATIVE
BUN SERPL-MCNC: 7 MG/DL (ref 5–25)
CALCIUM SERPL-MCNC: 8.3 MG/DL (ref 8.3–10.1)
CHLORIDE SERPL-SCNC: 99 MMOL/L (ref 100–108)
CLARITY UR: CLEAR
CO2 SERPL-SCNC: 20 MMOL/L (ref 21–32)
COLOR UR: YELLOW
CREAT SERPL-MCNC: 0.65 MG/DL (ref 0.6–1.3)
ERYTHROCYTE [DISTWIDTH] IN BLOOD BY AUTOMATED COUNT: 13.5 % (ref 11.6–15.1)
EST. AVERAGE GLUCOSE BLD GHB EST-MCNC: 114 MG/DL
GFR SERPL CREATININE-BSD FRML MDRD: 84 ML/MIN/1.73SQ M
GLUCOSE SERPL-MCNC: 129 MG/DL (ref 65–140)
GLUCOSE UR STRIP-MCNC: ABNORMAL MG/DL
HBA1C MFR BLD: 5.6 % (ref 4.2–6.3)
HCT VFR BLD AUTO: 32 % (ref 34.8–46.1)
HCT VFR BLD AUTO: 39 % (ref 34.8–46.1)
HGB BLD-MCNC: 11.4 G/DL (ref 11.5–15.4)
HGB BLD-MCNC: 13.6 G/DL (ref 11.5–15.4)
HGB UR QL STRIP.AUTO: NEGATIVE
KETONES UR STRIP-MCNC: NEGATIVE MG/DL
LACTATE SERPL-SCNC: 2.1 MMOL/L (ref 0.5–2)
LACTATE SERPL-SCNC: 2.7 MMOL/L (ref 0.5–2)
LEUKOCYTE ESTERASE UR QL STRIP: NEGATIVE
MAGNESIUM SERPL-MCNC: 1.8 MG/DL (ref 1.6–2.6)
MCH RBC QN AUTO: 31.8 PG (ref 26.8–34.3)
MCHC RBC AUTO-ENTMCNC: 34.9 G/DL (ref 31.4–37.4)
MCV RBC AUTO: 91 FL (ref 82–98)
NITRITE UR QL STRIP: NEGATIVE
PH UR STRIP.AUTO: 5.5 [PH] (ref 4.5–8)
PLATELET # BLD AUTO: 274 THOUSANDS/UL (ref 149–390)
PMV BLD AUTO: 8.8 FL (ref 8.9–12.7)
POTASSIUM SERPL-SCNC: 3.5 MMOL/L (ref 3.5–5.3)
PROT SERPL-MCNC: 7.2 G/DL (ref 6.4–8.2)
PROT UR STRIP-MCNC: NEGATIVE MG/DL
RBC # BLD AUTO: 4.28 MILLION/UL (ref 3.81–5.12)
SODIUM SERPL-SCNC: 131 MMOL/L (ref 136–145)
SP GR UR STRIP.AUTO: 1.02 (ref 1–1.03)
TSH SERPL DL<=0.05 MIU/L-ACNC: 2.21 UIU/ML (ref 0.36–3.74)
UROBILINOGEN UR QL STRIP.AUTO: 0.2 E.U./DL
WBC # BLD AUTO: 18.17 THOUSAND/UL (ref 4.31–10.16)

## 2017-10-22 PROCEDURE — 83735 ASSAY OF MAGNESIUM: CPT | Performed by: INTERNAL MEDICINE

## 2017-10-22 PROCEDURE — 94762 N-INVAS EAR/PLS OXIMTRY CONT: CPT | Performed by: SOCIAL WORKER

## 2017-10-22 PROCEDURE — 84443 ASSAY THYROID STIM HORMONE: CPT | Performed by: INTERNAL MEDICINE

## 2017-10-22 PROCEDURE — 83605 ASSAY OF LACTIC ACID: CPT | Performed by: SURGERY

## 2017-10-22 PROCEDURE — 83036 HEMOGLOBIN GLYCOSYLATED A1C: CPT | Performed by: INTERNAL MEDICINE

## 2017-10-22 PROCEDURE — 94760 N-INVAS EAR/PLS OXIMETRY 1: CPT

## 2017-10-22 PROCEDURE — 85014 HEMATOCRIT: CPT | Performed by: INTERNAL MEDICINE

## 2017-10-22 PROCEDURE — 85027 COMPLETE CBC AUTOMATED: CPT | Performed by: INTERNAL MEDICINE

## 2017-10-22 PROCEDURE — C9113 INJ PANTOPRAZOLE SODIUM, VIA: HCPCS | Performed by: SURGERY

## 2017-10-22 PROCEDURE — 80053 COMPREHEN METABOLIC PANEL: CPT | Performed by: INTERNAL MEDICINE

## 2017-10-22 PROCEDURE — 85018 HEMOGLOBIN: CPT | Performed by: INTERNAL MEDICINE

## 2017-10-22 PROCEDURE — 83605 ASSAY OF LACTIC ACID: CPT | Performed by: INTERNAL MEDICINE

## 2017-10-22 PROCEDURE — 81003 URINALYSIS AUTO W/O SCOPE: CPT | Performed by: SURGERY

## 2017-10-22 RX ADMIN — SODIUM CHLORIDE 125 ML/HR: 0.9 INJECTION, SOLUTION INTRAVENOUS at 21:29

## 2017-10-22 RX ADMIN — PIPERACILLIN SODIUM AND TAZOBACTAM SODIUM 3.38 G: 36; 4.5 INJECTION, POWDER, FOR SOLUTION INTRAVENOUS at 17:07

## 2017-10-22 RX ADMIN — PIPERACILLIN SODIUM AND TAZOBACTAM SODIUM 3.38 G: 36; 4.5 INJECTION, POWDER, FOR SOLUTION INTRAVENOUS at 11:06

## 2017-10-22 RX ADMIN — METRONIDAZOLE 500 MG: 500 INJECTION, SOLUTION INTRAVENOUS at 00:38

## 2017-10-22 RX ADMIN — PANTOPRAZOLE SODIUM 40 MG: 40 INJECTION, POWDER, FOR SOLUTION INTRAVENOUS at 08:20

## 2017-10-22 RX ADMIN — SODIUM CHLORIDE, SODIUM LACTATE, POTASSIUM CHLORIDE, AND CALCIUM CHLORIDE 1000 ML: .6; .31; .03; .02 INJECTION, SOLUTION INTRAVENOUS at 05:38

## 2017-10-22 RX ADMIN — AMLODIPINE BESYLATE 5 MG: 5 TABLET ORAL at 08:20

## 2017-10-22 RX ADMIN — VALSARTAN 320 MG: 160 TABLET ORAL at 08:20

## 2017-10-22 RX ADMIN — LEVOTHYROXINE SODIUM 88 MCG: 88 TABLET ORAL at 05:38

## 2017-10-22 RX ADMIN — PIPERACILLIN SODIUM AND TAZOBACTAM SODIUM 3.38 G: 36; 4.5 INJECTION, POWDER, FOR SOLUTION INTRAVENOUS at 22:38

## 2017-10-22 RX ADMIN — METRONIDAZOLE 500 MG: 500 INJECTION, SOLUTION INTRAVENOUS at 18:07

## 2017-10-22 RX ADMIN — SODIUM CHLORIDE 125 ML/HR: 0.9 INJECTION, SOLUTION INTRAVENOUS at 11:07

## 2017-10-22 RX ADMIN — PIPERACILLIN SODIUM AND TAZOBACTAM SODIUM 3.38 G: 36; 4.5 INJECTION, POWDER, FOR SOLUTION INTRAVENOUS at 05:03

## 2017-10-22 NOTE — PROGRESS NOTES
Progress Note - Colorectal Surgery   Samira Mcknight 80 y o  female MRN: [id number]  Unit/Bed#: [facility] 830-01 Encounter: [id number]    Assessment:    81yoF with distal ischemic colitis    -started on zosyn overnight  -wbc trending down  -afebrile  -tender LLQ but no peritonitis  -feels that pain is better  -lactate trending down almost cleared      Plan:    -increase IVF to 125  -continue zosyn  -continue NPO  -abdominal exams  -f/u cdiff   -highly recommend DVT prophylaxis  Bleeding related to mucosal sloughing         Subjective/Objective     Chief Complaint:     Subjective:       Objective:     Vitals: Blood pressure 133/61, pulse 89, temperature 98 6 °F (37 °C), temperature source Oral, resp  rate 20, height 5' 1" (1 549 m), weight 60 3 kg (133 lb), SpO2 96 %  ,Body mass index is 25 13 kg/m²      I/O       10/20 0701 - 10/21 0700 10/21 0701 - 10/22 0700 10/22 0701 - 10/23 0700    IV Piggyback  1000 1000    Total Intake(mL/kg)  1000 (16 6) 1000 (16 6)    Urine (mL/kg/hr)  2030     Stool  0     Total Output   2030      Net   -1030 +1000           Unmeasured Stool Occurrence  4 x           Physical Exam:     Alert  Sinus rhythm  Breath sounds bilaterally  Soft, non distended, tender LLQ, no peritonitis     Lab, Imaging and other studies:   CBC with diff:   Lab Results   Component Value Date    WBC 18 17 (H) 10/22/2017    HGB 13 6 10/22/2017    HCT 39 0 10/22/2017    MCV 91 10/22/2017     10/22/2017    MCH 31 8 10/22/2017    MCHC 34 9 10/22/2017    RDW 13 5 10/22/2017    MPV 8 8 (L) 10/22/2017    NRBC 0 10/21/2017   , Wound Culure: No results found for: WOUNDCULT  VTE Pharmacologic Prophylaxis: Reason for no pharmacologic prophylaxis per primary  VTE Mechanical Prophylaxis: sequential compression device

## 2017-10-22 NOTE — PLAN OF CARE
DISCHARGE PLANNING     Discharge to home or other facility with appropriate resources Progressing        GASTROINTESTINAL - ADULT     Minimal or absence of nausea and/or vomiting Progressing     Maintains or returns to baseline bowel function Progressing     Maintains adequate nutritional intake Progressing        INFECTION - ADULT     Absence or prevention of progression during hospitalization Progressing        Knowledge Deficit     Patient/family/caregiver demonstrates understanding of disease process, treatment plan, medications, and discharge instructions Progressing        PAIN - ADULT     Verbalizes/displays adequate comfort level or baseline comfort level Progressing        Potential for Falls     Patient will remain free of falls Progressing        SAFETY ADULT     Maintain or return to baseline ADL function Progressing     Maintain or return mobility status to optimal level Progressing

## 2017-10-22 NOTE — PROGRESS NOTES
Labs reviewed with significant leukocytosis and high lactate  Hb has remained relatively stable  I have discussed with the primary team and would recommend to do a CT scan with IV contrast to rule out ischemic disease  We will make her NPO

## 2017-10-22 NOTE — PROGRESS NOTES
IM Residency Progress Note   Unit/Bed#: [facility] 830-01 Encounter: [id number]  SOD Team B       Arnulfo Valladares 80 y o  female [address] Stay Days: 1      Assessment/Plan:    Principal Problem:    GI bleed  Active Problems:    Near Syncope    Leukocytosis    Anemia, acute    Hyperglycemia    Lactic acid acidosis    Essential hypertension    Urinary retention    Hypothyroidism    Dementia    Lower GI bleeding secondary to Ischemic Colitis vs Inflammatory Colitis  Patient continues to have hematochezia  Currently hemodynamically stable  Continue to monitor blood pressure  No tenderness on palpation however initially presented with left lower quadrant tenderness  Mild nausea and non-bilious and non-bloody vomiting this a m   CT of the abdomen and pelvis with intravenous contrast (no oral contrast was administered)- showed colonic wall thickening from the distal transverse colon to the anus   Suspect less likely infectious process  Patient has not had any diarrhea  Leukocytosis could be secondary to reactive inflammatory process  However C diff PCR, ova parasite, stool enteric bacteria panel pending collection (patient is currently constipated)  Continue Zosyn for broad-spectrum coverage  More likely ischemic colitis  Lactic acid initially 3 7 trending down  Currently 2 1  Continue intravenous normal saline  VAS Celiac and mesenteric duplex pending  Plan for elective colonoscopy once more stable  Currently NPO however will switched to clear liquid diet and advance as tolerated  Continue abdominal serial examinations  Continue Zofran for nausea  GI and Colorectal following  Appreciate recommendations  Presyncope  Likely related to acute blood loss from lower GI bleed  Patient is strict bedrest now, will access need in AM    PT/OT evaluation pending  Anemia  Likely secondary to acute blood loss  Hemoglobin 13 5 today  Monitor CBC daily       Hypertension  Continue amlodipine and valsartan    Hypothyroidism  TSH 2 210  With follow-up for to 4 weeks as outpatient  Continue Synthroid    Hx of Primary Biliary Cirrhosis  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 however is not currently on any regimen  Hx of Dementia? Per patient's son, he reports patient baseline mentation has declined over the years, patient is more forgetful  However patient does not carry an official diagnosis of dementia  Inpatient consult to gerontology  Hyperglycemia  Blood sugar on admission was 223  UA shows glucosuria  Patient denies any polyuria, polyphagia, polydipsia  Patient does not have a diagnosis of diabetes  Patient's hemoglobin A1c was 5 6    Disposition:  Continue to monitor H&H and hemodynamics  Subjective:   Patient seen examined at bedside  Patient was admitted yesterday  Patient states then she currently feels improved in comparison to yesterday  She reports mild bright red blood per rectum this morning that was nontender as well severe constipation ongoing for the past 2 days  Patient reports last bowel movement was approximately 2 days ago  Patient reports that she does have history of chronic constipation however this is worse than her prior episodes  She currently has no appetite  She took her morning medications and did report an episode of non-bilous, non-bloody vomitus  Vitals: Temp (24hrs), Av 7 °F (35 9 °C), Min:94 4 °F (34 7 °C), Max:98 5 °F (36 9 °C)  Current: Temperature: 98 5 °F (36 9 °C)  Vitals:    10/21/17 1921 10/21/17 2001 10/21/17 2300 10/22/17 0300   BP:  118/58 141/63 141/63   Pulse:  84 88 89   Resp:  16 16 20   Temp: (!) 95 7 °F (35 4 °C) (!) 96 8 °F (36 °C) 97 9 °F (36 6 °C) 98 5 °F (36 9 °C)   TempSrc:  Rectal Rectal Oral   SpO2:  98% 96% 97%   Weight:  60 3 kg (133 lb)     Height:  5' 1" (1 549 m)      Body mass index is 25 13 kg/m²  I/O last 24 hours:   In: 1000 [IV Piggyback:1000]  Out:  [Urine:2030]      Physical Exam: /58   Pulse 82   Temp 99 1 °F (37 3 °C) (Oral)   Resp 20   Ht 5' 1" (1 549 m)   Wt 60 3 kg (133 lb)   SpO2 98%   BMI 25 13 kg/m²   General appearance: alert, appears stated age, cooperative and no distress  Head: Normocephalic, without obvious abnormality, atraumatic, Moist Mucous Membranes  Lungs: clear to auscultation bilaterally  Heart: regular rate and rhythm, S1, S2 normal, no murmur, click, rub or gallop  Abdomen: soft, non-tender; bowel sounds normal; no masses,  no organomegaly  Extremities: extremities normal, atraumatic, no cyanosis or edema  Pulses: 2+ and symmetric     Invasive Devices     Peripheral Intravenous Line            Peripheral IV 10/21/17 Left Antecubital less than 1 day    Peripheral IV 10/21/17 Right Antecubital less than 1 day                          Labs:   Recent Results (from the past 24 hour(s))   CBC and differential    Collection Time: 10/21/17  3:47 PM   Result Value Ref Range    WBC 16 30 (H) 4 31 - 10 16 Thousand/uL    RBC 4 02 3 81 - 5 12 Million/uL    Hemoglobin 12 8 11 5 - 15 4 g/dL    Hematocrit 37 9 34 8 - 46 1 %    MCV 94 82 - 98 fL    MCH 31 8 26 8 - 34 3 pg    MCHC 33 8 31 4 - 37 4 g/dL    RDW 13 3 11 6 - 15 1 %    MPV 9 2 8 9 - 12 7 fL    Platelets 292 119 - 351 Thousands/uL    nRBC 0 /100 WBCs    Neutrophils Relative 80 (H) 43 - 75 %    Lymphocytes Relative 12 (L) 14 - 44 %    Monocytes Relative 8 4 - 12 %    Eosinophils Relative 0 0 - 6 %    Basophils Relative 0 0 - 1 %    Neutrophils Absolute 12 87 (H) 1 85 - 7 62 Thousands/µL    Lymphocytes Absolute 1 99 0 60 - 4 47 Thousands/µL    Monocytes Absolute 1 35 (H) 0 17 - 1 22 Thousand/µL    Eosinophils Absolute 0 03 0 00 - 0 61 Thousand/µL    Basophils Absolute 0 01 0 00 - 0 10 Thousands/µL   Comprehensive metabolic panel    Collection Time: 10/21/17  3:47 PM   Result Value Ref Range    Sodium 132 (L) 136 - 145 mmol/L    Potassium 3 6 3 5 - 5 3 mmol/L    Chloride 99 (L) 100 - 108 mmol/L    CO2 22 21 - 32 mmol/L    Anion Gap 11 4 - 13 mmol/L    BUN 18 5 - 25 mg/dL    Creatinine 1 00 0 60 - 1 30 mg/dL    Glucose 223 (H) 65 - 140 mg/dL    Calcium 8 8 8 3 - 10 1 mg/dL    AST 33 5 - 45 U/L    ALT 38 12 - 78 U/L    Alkaline Phosphatase 106 46 - 116 U/L    Total Protein 7 7 6 4 - 8 2 g/dL    Albumin 3 7 3 5 - 5 0 g/dL    Total Bilirubin 0 52 0 20 - 1 00 mg/dL    eGFR 53 ml/min/1 73sq m   Lactic acid, plasma    Collection Time: 10/21/17  4:13 PM   Result Value Ref Range    LACTIC ACID 3 7 (HH) 0 5 - 2 0 mmol/L   POCT Blood Gas (CG8+)    Collection Time: 10/21/17  4:23 PM   Result Value Ref Range    ph, Taj ISTAT 7 342 7 300 - 7 400    pCO2, Taj i-STAT 41 1 (L) 42 0 - 50 0 mm HG    pO2, Taj i-STAT 196 0 (H) 35 0 - 45 0 mm HG    BE, i-STAT -3 (L) -2 - 3 mmol/L    HCO3, Taj i-STAT 22 3 (L) 24 0 - 30 0 mmol/L    CO2, i-STAT 24 21 - 32 mmol/L    O2 Sat, i-STAT 100 (H) 95 - 98 %    SODIUM, I-STAT 131 (L) 136 - 145 mmol/l    Potassium, i-STAT 3 7 3 5 - 5 3 mmol/L    Calcium, Ionized i-STAT 1 15 1 12 - 1 32 mmol/L    Hct, i-STAT 38 34 8 - 46 1 %    Hgb, i-STAT 12 9 11 5 - 15 4 g/dl    Glucose, i-STAT 233 (H) 65 - 140 mg/dl    Specimen Type VENOUS    POCT troponin    Collection Time: 10/21/17  4:24 PM   Result Value Ref Range    POC Troponin I 0 00 0 00 - 0 08 ng/ml    Specimen Type VENOUS    ECG 12 lead    Collection Time: 10/21/17  4:30 PM   Result Value Ref Range    Ventricular Rate 63 BPM    Atrial Rate 63 BPM    KY Interval 170 ms    QRSD Interval 66 ms    QT Interval 434 ms    QTC Interval 444 ms    P Axis 79 degrees    QRS Axis 71 degrees    T Wave Axis 96 degrees   POCT Chem 8+    Collection Time: 10/21/17  4:32 PM   Result Value Ref Range    SODIUM, I-STAT 131 (L) 136 - 145 mmol/l    Potassium, i-STAT 3 8 3 5 - 5 3 mmol/L    Chloride, istat 99 (L) 100 - 108 mmol/L    CO2, i-STAT 24 21 - 32 mmol/L    Anion Gap, Istat 13 4 - 13 mmol/L    Calcium, Ionized i-STAT 1 13 1 12 - 1 32 mmol/L    BUN, I-STAT 23 5 - 25 mg/dl Creatinine, i-STAT 0 8 0 6 - 1 3 mg/dl    eGFR 69 ml/min/1 73sq m    Glucose, i-STAT 231 (H) 65 - 140 mg/dl    Hct, i-STAT 40 34 8 - 46 1 %    Hgb, i-STAT 13 6 11 5 - 15 4 g/dl    Specimen Type VENOUS    ECG 12 lead    Collection Time: 10/21/17  4:33 PM   Result Value Ref Range    Ventricular Rate 65 BPM    Atrial Rate 65 BPM    AZ Interval 166 ms    QRSD Interval 74 ms    QT Interval 434 ms    QTC Interval 451 ms    P Axis 63 degrees    QRS Axis 72 degrees    T Wave Axis 88 degrees   Type and screen    Collection Time: 10/21/17  4:54 PM   Result Value Ref Range    ABO Grouping A     Rh Factor Positive     Antibody Screen Negative     Specimen Expiration Date [id number]    CBC and differential    Collection Time: 10/21/17  6:17 PM   Result Value Ref Range    WBC 22 46 (H) 4 31 - 10 16 Thousand/uL    RBC 3 58 (L) 3 81 - 5 12 Million/uL    Hemoglobin 11 3 (L) 11 5 - 15 4 g/dL    Hematocrit 33 6 (L) 34 8 - 46 1 %    MCV 94 82 - 98 fL    MCH 31 6 26 8 - 34 3 pg    MCHC 33 6 31 4 - 37 4 g/dL    RDW 13 3 11 6 - 15 1 %    MPV 8 8 (L) 8 9 - 12 7 fL    Platelets 742 904 - 563 Thousands/uL    nRBC 0 /100 WBCs   Lactic acid, plasma    Collection Time: 10/21/17  6:17 PM   Result Value Ref Range    LACTIC ACID 2 8 (HH) 0 5 - 2 0 mmol/L   Manual Differential(PHLEBS Do Not Order)    Collection Time: 10/21/17  6:17 PM   Result Value Ref Range    Segmented % 88 (H) 43 - 75 %    Lymphocytes % 3 (L) 14 - 44 %    Monocytes % 7 4 - 12 %    Eosinophils % 0 0 - 6 %    Basophils % 0 0 - 1 %    Atypical Lymphocytes % 2 (H) <=0 %    Absolute Neutrophils 19 76 (H) 1 85 - 7 62 Thousand/uL    Lymphocytes Absolute 0 67 0 60 - 4 47 Thousand/uL    Monocytes Absolute 1 57 (H) 0 00 - 1 22 Thousand/uL    Eosinophils Absolute 0 00 0 00 - 0 40 Thousand/uL    Basophils Absolute 0 00 0 00 - 0 10 Thousand/uL    Total Counted      RBC Morphology Present     Anisocytosis Present     Macrocytes Present     Poikilocytes Present     Platelet Estimate Adequate Adequate   Hemoglobin and hematocrit, blood    Collection Time: 10/21/17 11:45 PM   Result Value Ref Range    Hemoglobin 13 4 11 5 - 15 4 g/dL    Hematocrit 39 1 34 8 - 46 1 %   UA (URINE) with reflex to Microscopic    Collection Time: 10/22/17 12:10 AM   Result Value Ref Range    Color, UA Yellow     Clarity, UA Clear     Specific Gravity, UA 1 017 1 003 - 1 030    pH, UA 5 5 4 5 - 8 0    Leukocytes, UA Negative Negative    Nitrite, UA Negative Negative    Protein, UA Negative Negative mg/dl    Glucose,  (1/4%) (A) Negative mg/dl    Ketones, UA Negative Negative mg/dl    Urobilinogen, UA 0 2 0 2, 1 0 E U /dl E U /dl    Bilirubin, UA Negative Negative    Blood, UA Negative Negative   Lactic acid, plasma    Collection Time: 10/22/17 12:25 AM   Result Value Ref Range    LACTIC ACID 2 7 (HH) 0 5 - 2 0 mmol/L       Radiology Results: I have personally reviewed pertinent reports  Other Diagnostic Testing:   I have personally reviewed pertinent reports  Active Meds:   Current Facility-Administered Medications   Medication Dose Route Frequency    amLODIPine (NORVASC) tablet 5 mg  5 mg Oral Daily    And    valsartan (DIOVAN) tablet 320 mg  320 mg Oral Daily    lactated ringers bolus 1,000 mL  1,000 mL Intravenous Once    levothyroxine tablet 88 mcg  88 mcg Oral Early Morning    metroNIDAZOLE (FLAGYL) IVPB (premix) 500 mg  500 mg Intravenous Q8H    ondansetron (ZOFRAN) injection 4 mg  4 mg Intravenous Q6H PRN    pantoprazole (PROTONIX) injection 40 mg  40 mg Intravenous Q24H JOSSELIN    piperacillin-tazobactam (ZOSYN) 3 375 g in sodium chloride 0 9 % 50 mL IVPB  3 375 g Intravenous Q6H    sodium chloride 0 9 % infusion  125 mL/hr Intravenous Continuous         VTE Pharmacologic Prophylaxis: Reason for no pharmacologic prophylaxis LGI  VTE Mechanical Prophylaxis: sequential compression device    Arlyn Schirmer, M D    Internal Medicine PGY-1  10/22/2017 6:51 AM

## 2017-10-22 NOTE — PROGRESS NOTES
Progress Note - Arnulfo Valladares 80 y o  female MRN: [id number]    Unit/Bed#: [facility] 830-01 Encounter: [id number]    Assessment and Plan:   Principal Problem:    GI bleed  Active Problems:    Near Syncope    Leukocytosis    Anemia, acute    Hyperglycemia    Lactic acid acidosis    Essential hypertension    Urinary retention    Hypothyroidism    Dementia    #1  Left sided colitis, likely ischemic: differential includes infectious or ischemic  Favor ischemic as patient had mild hypotension and has an elevated lactic acid  She has a history of syncope per the son but did not syncopize yesterday  Hemoglobin is stable and actually increased it to 13 6 today  Lactic acid is slowly improving  White blood cell count decreased from 22 4 to 18 1 today  -Reviewed CT of abdomen with radiology  There is some atherosclerosis at the origin of the SMA and also in the celiac artery  He is unable to visualize the NENA well  He recommended either a repeat CT angiography versus a mesenteric duplex to further evaluate the abdominal vessels  We will order a mesenteric duplex for now  Pending results, can consider a vascular consult   -continue Zosyn IV  -continue NPO for now  Could consider clear liquids if patient can tolerate  She did have an episode of vomiting this morning   -rule out infectious etiology  C diff pending  -serial abdominal exams  -aggressive IV fluid hydration  -continue to monitor white blood cell count and temperature    ----------------------------------------------------------------------------------------------------------------    Subjective:     Patient has no abdominal pain unless palpated and then there is some in the LLQ  Son reports vomiting after pills this     Objective:     Vitals: Blood pressure 133/61, pulse 89, temperature 98 6 °F (37 °C), temperature source Oral, resp  rate 20, height 5' 1" (1 549 m), weight 60 3 kg (133 lb), SpO2 96 %  ,Body mass index is 25 13 kg/m²        Intake/Output Summary (Last 24 hours) at 10/22/17 1132  Last data filed at 10/22/17 1107   Gross per 24 hour   Intake          3323 33 ml   Output             2330 ml   Net           993 33 ml       Physical Exam:     General Appearance: Alert, appears stated age and cooperative  Lungs: Clear to auscultation bilaterally, no rales or rhonchi, no labored breathing/accessory muscle use  Heart: Regular rate and rhythm, S1, S2 normal, no murmur, click, rub or gallop  Abdomen: Soft, LLQ tenderness, non-distended; bowel sounds normal; no masses or no organomegaly  Extremities: No cyanosis, clubbing, or edema    Invasive Devices     Peripheral Intravenous Line            Peripheral IV 10/21/17 Left Antecubital less than 1 day    Peripheral IV 10/21/17 Right Antecubital less than 1 day                Lab Results:    Results from last 7 days  Lab Units 10/22/17  0633  10/21/17  1817  10/21/17  1547   WBC Thousand/uL 18 17*  --  22 46*  --  16 30*   HEMOGLOBIN g/dL 13 6  < > 11 3*  --  12 8   I STAT HEMOGLOBIN   --   --   --   < >  --    HEMATOCRIT % 39 0  < > 33 6*  --  37 9   PLATELETS Thousands/uL 274  --  251  --  311   NEUTROS PCT %  --   --   --   --  80*   LYMPHS PCT %  --   --   --   --  12*   LYMPHO PCT %  --   --  3*  --   --    MONOS PCT %  --   --   --   --  8   MONO PCT MAN %  --   --  7  --   --    EOS PCT %  --   --   --   --  0   EOSINO PCT MANUAL %  --   --  0  --   --    < > = values in this interval not displayed  Results from last 7 days  Lab Units 10/22/17  0633   SODIUM mmol/L 131*   POTASSIUM mmol/L 3 5   CHLORIDE mmol/L 99*   CO2 mmol/L 20*   BUN mg/dL 7   CREATININE mg/dL 0 65   CALCIUM mg/dL 8 3   TOTAL PROTEIN g/dL 7 2   BILIRUBIN TOTAL mg/dL 0 88   ALK PHOS U/L 104   ALT U/L 126*   AST U/L 77*   GLUCOSE RANDOM mg/dL 129               Imaging Studies: I have personally reviewed pertinent imaging studies      Ct Abdomen Pelvis W Contrast    Result Date: 10/21/2017  Impression: There is significant long segment colonic wall thickening from the distal transverse colon to the anus  Infectious or inflammatory colitis versus ischemic bowel to be considered  Appropriate clinical and lab work up suggested  There is also patchy diminished enhancement within both kidneys concerning for possible pyelonephritis  Correlate with urinalysis  There is a tiny gas bubble in the bladder lumen which might also indicate infection   ##imslh##imslh Workstation performed: [id number]

## 2017-10-22 NOTE — CONSULTS
Consultation - General Surgery   Karen Stephen 80 y o  female MRN: [id number]  Unit/Bed#: [facility] 830-01 Encounter: [id number]    Assessment/Plan     Assessment:  Patient is an 66-year-old female from [facility] who presents with a GI bleed  Plan:  NPO   Trend lactic acid, hydrate appropriately   Serial abdominal exams    Protonix QD  IV Zosyn  F/u Stool cx, ova/parasites, C  diff  D/C Golytely  GI following  Primary care per Internal Medicine    History of Present Illness     HPI:  Karen Stephen is a 80 y o  female who presents with dementia, hypertension, hypothyroid, primary biliary cirrhosis who presents with a GI bleed since yesterday  Start have lightheadedness, and weakness  She was bloody bowel movement today and red blood per rectum  In the emergency department she also had three more bright red bowel movements  She has diffuse lower quadrant abdominal pain which started tonight  She was bolused in the ED  GI evaluated her and she is started on a Protonix drip and is being prepped for colonoscopy, however with her leukocytosis and lactate is CT was ordered  CT scan showed thickening in the wall of the colon from the distal transverse colon to the anus probably infectious or inflammatory colitis with possible ischemic bowel considered less likely  Right now she is not nauseous vomiting, no fevers or chills, no chest pain  She is still lightheaded and weak is still having bloody bowel movements    Inpatient consult to Colorectal Surgery  Date/Time: 10/21/2017 11:51 PM  Performed by: Kelton Cruz  Authorized by: Wendi Marcus           Review of Systems   Constitutional: Positive for fatigue  Negative for appetite change, chills and fever  Respiratory: Negative for chest tightness and shortness of breath  Cardiovascular: Negative for chest pain  Gastrointestinal: Positive for abdominal pain and blood in stool  Negative for abdominal distention, nausea and vomiting     Genitourinary: Positive for difficulty urinating  Skin: Negative for color change  Neurological: Positive for dizziness and light-headedness  Historical Information   Past Medical History:   Diagnosis Date    Disease of thyroid gland     Hypertension      History reviewed  No pertinent surgical history  Social History   History   Alcohol Use No     History   Drug Use No     History   Smoking Status    Never Smoker   Smokeless Tobacco    Never Used     Family History: non-contributory    Meds/Allergies   all current active meds have been reviewed  No Known Allergies    Objective   First Vitals:   Blood Pressure: 131/61 (10/21/17 1535)  Pulse: 64 (10/21/17 1621)  Temperature: (!) 94 4 °F (34 7 °C) (10/21/17 1723)  Temp Source: Rectal (10/21/17 1723)  Respirations: 18 (10/21/17 1621)  Height: 5' 1" (154 9 cm) (10/21/17 2001)  Weight - Scale: 60 3 kg (133 lb) (10/21/17 2001)  SpO2: 99 % (10/21/17 1556)    Current Vitals:   Blood Pressure: 118/58 (10/21/17 2001)  Pulse: 84 (10/21/17 2001)  Temperature: (!) 96 8 °F (36 °C) (rn aware ) (10/21/17 2001)  Temp Source: Rectal (10/21/17 2001)  Respirations: 16 (10/21/17 2001)  Height: 5' 1" (154 9 cm) (10/21/17 2001)  Weight - Scale: 60 3 kg (133 lb) (10/21/17 2001)  SpO2: 98 % (10/21/17 2001)      Intake/Output Summary (Last 24 hours) at 10/21/17 2235  Last data filed at 10/21/17 2216   Gross per 24 hour   Intake                0 ml   Output              500 ml   Net             -500 ml       Invasive Devices     Peripheral Intravenous Line            Peripheral IV 10/21/17 Left Antecubital less than 1 day    Peripheral IV 10/21/17 Right Antecubital less than 1 day                Physical Exam   Constitutional: She appears well-developed  HENT:   Head: Normocephalic and atraumatic  Eyes: Pupils are equal, round, and reactive to light  Cardiovascular: Normal rate  Pulmonary/Chest: Effort normal    Abdominal: Soft  She exhibits no distension  There is tenderness   There is no rebound and no guarding  Tender diffusely in lower quadrants   Neurological: She is alert  Skin: Skin is warm and dry  Psychiatric: She has a normal mood and affect  Lab Results: I have personally reviewed pertinent lab results  Imaging: I have personally reviewed pertinent reports  EKG, Pathology, and Other Studies: I have personally reviewed pertinent reports
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
## 2017-10-23 ENCOUNTER — APPOINTMENT (INPATIENT)
Dept: NON INVASIVE DIAGNOSTICS | Facility: HOSPITAL | Age: 82
DRG: 372 | End: 2017-10-23
Payer: MEDICARE

## 2017-10-23 LAB
ALBUMIN SERPL BCP-MCNC: 2.3 G/DL (ref 3.5–5)
ALP SERPL-CCNC: 79 U/L (ref 46–116)
ALT SERPL W P-5'-P-CCNC: 60 U/L (ref 12–78)
ANION GAP SERPL CALCULATED.3IONS-SCNC: 10 MMOL/L (ref 4–13)
AST SERPL W P-5'-P-CCNC: 36 U/L (ref 5–45)
BILIRUB SERPL-MCNC: 0.64 MG/DL (ref 0.2–1)
BUN SERPL-MCNC: 4 MG/DL (ref 5–25)
CALCIUM SERPL-MCNC: 8.1 MG/DL (ref 8.3–10.1)
CHLORIDE SERPL-SCNC: 103 MMOL/L (ref 100–108)
CO2 SERPL-SCNC: 21 MMOL/L (ref 21–32)
CREAT SERPL-MCNC: 0.54 MG/DL (ref 0.6–1.3)
ERYTHROCYTE [DISTWIDTH] IN BLOOD BY AUTOMATED COUNT: 13.7 % (ref 11.6–15.1)
GFR SERPL CREATININE-BSD FRML MDRD: 89 ML/MIN/1.73SQ M
GLUCOSE SERPL-MCNC: 83 MG/DL (ref 65–140)
HCT VFR BLD AUTO: 30.6 % (ref 34.8–46.1)
HGB BLD-MCNC: 10.8 G/DL (ref 11.5–15.4)
LACTATE SERPL-SCNC: 0.8 MMOL/L (ref 0.5–2)
MCH RBC QN AUTO: 31.8 PG (ref 26.8–34.3)
MCHC RBC AUTO-ENTMCNC: 35.3 G/DL (ref 31.4–37.4)
MCV RBC AUTO: 90 FL (ref 82–98)
PLATELET # BLD AUTO: 220 THOUSANDS/UL (ref 149–390)
PMV BLD AUTO: 8.4 FL (ref 8.9–12.7)
POTASSIUM SERPL-SCNC: 3 MMOL/L (ref 3.5–5.3)
PROT SERPL-MCNC: 5.8 G/DL (ref 6.4–8.2)
RBC # BLD AUTO: 3.4 MILLION/UL (ref 3.81–5.12)
SODIUM SERPL-SCNC: 134 MMOL/L (ref 136–145)
WBC # BLD AUTO: 22.9 THOUSAND/UL (ref 4.31–10.16)

## 2017-10-23 PROCEDURE — 97163 PT EVAL HIGH COMPLEX 45 MIN: CPT

## 2017-10-23 PROCEDURE — C9113 INJ PANTOPRAZOLE SODIUM, VIA: HCPCS | Performed by: SURGERY

## 2017-10-23 PROCEDURE — 87209 SMEAR COMPLEX STAIN: CPT | Performed by: INTERNAL MEDICINE

## 2017-10-23 PROCEDURE — 80053 COMPREHEN METABOLIC PANEL: CPT | Performed by: INTERNAL MEDICINE

## 2017-10-23 PROCEDURE — G8987 SELF CARE CURRENT STATUS: HCPCS

## 2017-10-23 PROCEDURE — 87177 OVA AND PARASITES SMEARS: CPT | Performed by: INTERNAL MEDICINE

## 2017-10-23 PROCEDURE — 94762 N-INVAS EAR/PLS OXIMTRY CONT: CPT

## 2017-10-23 PROCEDURE — G8978 MOBILITY CURRENT STATUS: HCPCS

## 2017-10-23 PROCEDURE — 87505 NFCT AGENT DETECTION GI: CPT | Performed by: SURGERY

## 2017-10-23 PROCEDURE — G8979 MOBILITY GOAL STATUS: HCPCS

## 2017-10-23 PROCEDURE — G8988 SELF CARE GOAL STATUS: HCPCS

## 2017-10-23 PROCEDURE — 93975 VASCULAR STUDY: CPT

## 2017-10-23 PROCEDURE — 83605 ASSAY OF LACTIC ACID: CPT | Performed by: SURGERY

## 2017-10-23 PROCEDURE — 97167 OT EVAL HIGH COMPLEX 60 MIN: CPT

## 2017-10-23 PROCEDURE — 87493 C DIFF AMPLIFIED PROBE: CPT | Performed by: SURGERY

## 2017-10-23 PROCEDURE — 85027 COMPLETE CBC AUTOMATED: CPT | Performed by: INTERNAL MEDICINE

## 2017-10-23 RX ORDER — POTASSIUM CHLORIDE 14.9 MG/ML
20 INJECTION INTRAVENOUS
Status: COMPLETED | OUTPATIENT
Start: 2017-10-23 | End: 2017-10-23

## 2017-10-23 RX ORDER — POTASSIUM CHLORIDE 20 MEQ/1
40 TABLET, EXTENDED RELEASE ORAL ONCE
Status: DISCONTINUED | OUTPATIENT
Start: 2017-10-23 | End: 2017-10-23

## 2017-10-23 RX ORDER — POTASSIUM CHLORIDE 14.9 MG/ML
20 INJECTION INTRAVENOUS ONCE
Status: DISCONTINUED | OUTPATIENT
Start: 2017-10-23 | End: 2017-10-23

## 2017-10-23 RX ADMIN — PANTOPRAZOLE SODIUM 40 MG: 40 INJECTION, POWDER, FOR SOLUTION INTRAVENOUS at 09:57

## 2017-10-23 RX ADMIN — SODIUM CHLORIDE 125 ML/HR: 0.9 INJECTION, SOLUTION INTRAVENOUS at 14:48

## 2017-10-23 RX ADMIN — ONDANSETRON 4 MG: 2 INJECTION INTRAMUSCULAR; INTRAVENOUS at 14:47

## 2017-10-23 RX ADMIN — POTASSIUM CHLORIDE 20 MEQ: 200 INJECTION, SOLUTION INTRAVENOUS at 10:05

## 2017-10-23 RX ADMIN — METRONIDAZOLE 500 MG: 500 INJECTION, SOLUTION INTRAVENOUS at 09:58

## 2017-10-23 RX ADMIN — METRONIDAZOLE 500 MG: 500 INJECTION, SOLUTION INTRAVENOUS at 17:34

## 2017-10-23 RX ADMIN — ENOXAPARIN SODIUM 40 MG: 40 INJECTION SUBCUTANEOUS at 10:02

## 2017-10-23 RX ADMIN — VALSARTAN 320 MG: 160 TABLET ORAL at 10:02

## 2017-10-23 RX ADMIN — SODIUM CHLORIDE 125 ML/HR: 0.9 INJECTION, SOLUTION INTRAVENOUS at 05:30

## 2017-10-23 RX ADMIN — AMLODIPINE BESYLATE 5 MG: 5 TABLET ORAL at 10:02

## 2017-10-23 RX ADMIN — LEVOTHYROXINE SODIUM 88 MCG: 88 TABLET ORAL at 05:30

## 2017-10-23 RX ADMIN — METRONIDAZOLE 500 MG: 500 INJECTION, SOLUTION INTRAVENOUS at 03:33

## 2017-10-23 RX ADMIN — PIPERACILLIN SODIUM AND TAZOBACTAM SODIUM 3.38 G: 36; 4.5 INJECTION, POWDER, FOR SOLUTION INTRAVENOUS at 05:30

## 2017-10-23 RX ADMIN — POTASSIUM CHLORIDE 20 MEQ: 200 INJECTION, SOLUTION INTRAVENOUS at 11:49
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
## 2017-10-23 NOTE — SOCIAL WORK
CM received a call from pt daughter in law Lucila Stage   Lucila Stage states she will need SNF and cm reviewed snf list and requested referral [facility], [facility] [address] [facility]

## 2017-10-23 NOTE — CASE MANAGEMENT
Initial Clinical Review    Admission: Date/Time/Statement: 10/21/17 @ 1752     Orders Placed This Encounter   Procedures    Inpatient Admission (expected length of stay for this patient is greater than two midnights)     Standing Status:   Standing     Number of Occurrences:   1     Order Specific Question:   Admitting Physician     Answer:   Domenick Litten     Order Specific Question:   Level of Care     Answer:   Level 2 Stepdown / HOT [14]     Order Specific Question:   Estimated length of stay     Answer:   More than 2 Midnights     Order Specific Question:   Certification     Answer:   I certify that inpatient services are medically necessary for this patient for a duration of greater than two midnights  See H&P and MD Progress Notes for additional information about the patient's course of treatment  ED: Date/Time/Mode of Arrival:   ED Arrival Information     Expected Arrival Acuity Means of Arrival Escorted By Service Admission Type    - 10/21/2017 15:10 [facility]gent Amb[facility] General Medicine Urgent    Arrival Complaint    SYNCOPE          Chief Complaint:   Chief Complaint   Patient presents with    Dizziness     pt became dizzy, lightheaded, near sycopal  FSBS 172  Pt hypotensive prehospital, given 500 ml NSS bolus     History of Illness: Ara Alcantara is a 80 y o  female with past medical history significant for hypertension hypothyroidism primary biliary cirrhosis dementia  Patient was visiting his son here in [place], she has been here for about 2 weeks, and today she started feeling lightheadedness which is something new for her, at some point she also was weak and fatigue  Symptoms were persistent, son was concerned called the ambulance, patient arrived to the emergency room, and requested to go to the bathroom, while there she noticed she has a  bloody bowel movement, red blood per rectum    GI was consulted from the ED, Dr Gutierrez Hameed saw the patient and recommended to start a bowel prep for possible colonoscopy later on, and reassess H&H  At this Point patient's vitals are stable  Patient denies any longer having lightheadedness, denies also shortness of breath chest pain abdominal pain, admit not been able to void, and this is something new for her  History obtained from patient     ED Vital Signs:   ED Triage Vitals   Temperature Pulse Respirations Blood Pressure SpO2   10/21/17 1723 10/21/17 1621 10/21/17 1621 10/21/17 1535 10/21/17 1556   (!) 94 4 °F (34 7 °C) 64 18 131/61 99 %      Temp Source Heart Rate Source Patient Position - Orthostatic VS BP Location FiO2 (%)   10/21/17 1723 10/21/17 1621 10/21/17 1621 10/21/17 1621 --   Rectal Monitor Lying Right arm       Pain Score       10/21/17 1621       5        Wt Readings from Last 1 Encounters:   10/23/17 62 7 kg (138 lb 3 7 oz)     Vital Signs (abnormal):   10/21/17 2001   96 8 °F (36 °C)  84  16  118/58  --  98 %    10/21/17 1921   95 7 °F (35 4 °C)  --  --  --  --  --    10/21/17 1723   94 4 °F (34 7 °C)  --  --  --  --  --      Abnormal Labs:   10/21  10/22 10/23   Sodium 132  131 134   Potassium 3 6  3 5 3 0   Chloride 99  99 103   CO2 22  20 21   Anion Gap 11  12 10   BUN 18  7 4   Creatinine 1 00  0 65 0 54   Glucose 223  129 83   Calcium 8 8  8 3 8 1   AST 33  77 36   ALT 38  126 60   Alkaline Phosphatase 106  104 79   Total Protein 7 7  7 2 5 8   Albumin 3 7  3 3 2 3   Total Bilirubin 0 52  0 88 0 64   eGFR 53 69 84 89   Magnesium   1 8    CHLORIDE, ISTAT  99       Lactic Acid - 3 7, 2 8, 2 7, 2 1   10/21   10/22  10/23    WBC 16 30 22 46  18 17  22 90    RBC 4 02 3 58  4 28  3 40    Hemoglobin 12 8 11 3 13 4 13 6 11 4 10 8    Hematocrit 37 9 33 6 39 1 39 0 32 0 30 6      Diagnostic Test Results:     EKG - Normal sinus rhythm  Low voltage QRS  Septal infarct , age undetermined   Abnormal ECG    CT abd/pelvis - There is significant long segment colonic wall thickening from the distal transverse colon to the anus  Infectious or inflammatory colitis versus ischemic bowel to be considered  Appropriate clinical and lab work up suggested  There is also patchy diminished enhancement within both kidneys concerning for possible pyelonephritis  Correlate with urinalysis  There is a tiny gas bubble in the bladder lumen which might also indicate infection  VAC celiac and /or mesenteric duplex - Pending    ED Treatment:   Medication Administration from 10/21/2017 1510 to 10/21/2017 1949    Date/Time Order Dose Route Action   10/21/2017 1809 pantoprazole (PROTONIX) 80 mg in sodium chloride 0 9 % 100 mL infusion 8 mg/hr Intravenous New Bag   10/21/2017 1835 pantoprazole (PROTONIX) 80 mg in sodium chloride 0 9 % 100 mL IVPB 80 mg Intravenous New Bag   10/21/2017 1800 sodium chloride 0 9 % bolus 1,000 mL 1,000 mL Intravenous New Bag   10/21/2017 1920 polyethylene glycol (GOLYTELY) bowel prep 4,000 mL 4,000 mL Oral Given        Past Medical/Surgical History: Active Ambulatory Problems     Diagnosis Date Noted    No Active Ambulatory Problems     Resolved Ambulatory Problems     Diagnosis Date Noted    No Resolved Ambulatory Problems     Past Medical History:   Diagnosis Date    Disease of thyroid gland     Hypertension      Admitting Diagnosis: Melena [K92 1]  Syncope [R55]  GI bleed [K92 2]    Age/Sex: 80 y o  female    Assessment/Plan:   Patient Active Problem List   Diagnosis    GI bleed  Hemoglobin stable 12 8 was around 4:00 p m  Will repeat H&H q6  Patient's vitals looks stable at this time, will monitor  Will continue PPI drip  Will continue bowel prep, for possible colonoscopy later on  Keep NPO, likely EGD to be scheduled  GI consulted:  Dr Kristal Loredo will reassess patient and next H&H he may consider to do a colonoscopy in an Urgent basis depending on assessment    Call Dr Rita Moralez if any signs of active bleeding at any time       Leukocytosis  Likely reactive  Will monitor       Anemia, acute  Hemoglobin 12 8  Will monitor       Hyperglycemia  Blood sugar on admission is 223    Hypertension  Blood pressure is 121/58  Will continue to monitor  Patient's current medication include amlodipine 5 mg daily and Irbesartan 300 mg at bedtime    Hypothyroidism  Will get a TSH  The meantime continue with levothyroxine 88 mcg daily     Urinary retention   Patient complained on the ED unable to void  Bladder scan 900 cc, straight cath x1     Dementia  AAOx3 at this time, although the son states she has good days and bad days  And historically she had refused any medication for dementia    Lactic acid acidosis  Lactic acid 3 7 on admission  Will trend    VTE Pharmacologic Prophylaxis: Sequential compression device (Venodyne)   VTE Mechanical Prophylaxis: sequential compression device   Disposition:   Admitted to [facility]  Inpatient > 2 midnights    Admission Orders:  Scheduled Meds:   amLODIPine 5 mg Oral Daily   And      valsartan 320 mg Oral Daily   enoxaparin 40 mg Subcutaneous Daily   levothyroxine 88 mcg Oral Early Morning   metroNIDAZOLE 500 mg Intravenous Q8H   pantoprazole 40 mg Intravenous Q24H [address]     Continuous Infusions:   sodium chloride 125 mL/hr Last Rate: 125 mL/hr (10/23/17 1448)     PRN Meds: ondansetron IV x 2    Cons GI  Cons Surgery   Cons Colorectal Surgery   Cons Gerontology  Diet cl liq  Stool for c diff, O & P   Tele  Daily wt  SCDs  Up with assist  ___________________________________________________  10/21 GI Consult  1  Gastrointestinal hemorrhage with hematochezia  Possible sources are lower GI bleeding including diverticulosis, AVMs or mass lesion  I there is a possibility of upper GI bleed especially view to her hypotensive episode  This could include peptic ulcer disease or AVMs  Her BUN was normal   She did have an episode of coffee-ground emesis a few months ago  I do not have the results from the endoscopy that was done around that time    At this time I would recommend the following  -start Protonix drip   -her hemoglobin level on presented to the emergency room was normal   However she has been hydrated and I am sure she would be at a lower level  repeat hemoglobin levels   -transfuse as needed   -I would like to give her at least half a GoLYTELY prep in the next 2 hours to clear out the colon as much as possible  If she remains stable overnight she will continue the prep for a scope tomorrow or Monday  If she has any further episodes of hypotension or the hemoglobin drops significantly then we will plan to do an endoscopy and possible colonoscopy today  I discussed this with the team and with the patient's son  -recommend close it follow-up in step down   -hold off on CT bleeding protocol at this time since we are planning on doing a colonoscopy if the bleeding were to increase   ______________________________________________________  10/21 Colorectal Surg Consult  Assessment:  Patient is an 75-year-old female from [facility] who presents with a GI bleed  Plan:  NPO   Trend lactic acid, hydrate appropriately   Serial abdominal exams    Protonix QD  IV Zosyn  F/u Stool cx, ova/parasites, C  diff  D/C Golytely  GI following  Primary care per Internal Medicine  _____________________________________________________  10/22 Medicine Progress Note   Principal Problem:    GI bleed  Active Problems:    Near Syncope    Leukocytosis    Anemia, acute    Hyperglycemia    Lactic acid acidosis    Essential hypertension    Urinary retention    Hypothyroidism    Dementia     Lower GI bleeding secondary to Ischemic Colitis vs Inflammatory Colitis  Patient continues to have hematochezia  Currently hemodynamically stable  Continue to monitor blood pressure  No tenderness on palpation however initially presented with left lower quadrant tenderness   Mild nausea and non-bilious and non-bloody vomiting this a m   CT of the abdomen and pelvis with intravenous contrast (no oral contrast was administered)- showed colonic wall thickening from the distal transverse colon to the anus   Suspect less likely infectious process  Patient has not had any diarrhea  Leukocytosis could be secondary to reactive inflammatory process  However C diff PCR, ova parasite, stool enteric bacteria panel pending collection (patient is currently constipated)  Continue Zosyn for broad-spectrum coverage  More likely ischemic colitis  Lactic acid initially 3 7 trending down  Currently 2 1  Continue intravenous normal saline  VAS Celiac and mesenteric duplex pending  Plan for elective colonoscopy once more stable  Currently NPO however will switched to clear liquid diet and advance as tolerated  Continue abdominal serial examinations  Continue Zofran for nausea  GI and Colorectal following  Appreciate recommendations       Presyncope  Likely related to acute blood loss from lower GI bleed  Patient is strict bedrest now, will access need in AM    PT/OT evaluation pending       Anemia  Likely secondary to acute blood loss  Hemoglobin 13 5 today  Monitor CBC daily       Hypertension  Continue amlodipine and valsartan     Hypothyroidism  TSH 2 210  With follow-up for to 4 weeks as outpatient  Continue Synthroid     Hx of Primary Biliary Cirrhosis  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 however is not currently on any regimen          Hx of Dementia? Per patient's son, he reports patient baseline mentation has declined over the years, patient is more forgetful  However patient does not carry an official diagnosis of dementia  Inpatient consult to gerontology          Hyperglycemia  Blood sugar on admission was 223  UA shows glucosuria  Patient denies any polyuria, polyphagia, polydipsia  Patient does not have a diagnosis of diabetes  Patient's hemoglobin A1c was 5 6     Disposition:  Continue to monitor H&H and hemodynamics     ___________________________________________________________  10/22 Colorectal Surg Progress Note  81yoF with distal ischemic colitis     -started on zosyn overnight  -wbc trending down  -afebrile  -tender LLQ but no peritonitis  -feels that pain is better  -lactate trending down almost cleared      Plan:     -increase IVF to 125  -continue zosyn  -continue NPO  -abdominal exams  -f/u cdiff   -highly recommend DVT prophylaxis  Bleeding related to mucosal sloughing    ____________________________________________________   10/22 GI Progress Note  Principal Problem:    GI bleed  Active Problems:    Near Syncope    Leukocytosis    Anemia, acute    Hyperglycemia    Lactic acid acidosis    Essential hypertension    Urinary retention    Hypothyroidism    Dementia     #1  Left sided colitis, likely ischemic: differential includes infectious or ischemic  Favor ischemic as patient had mild hypotension and has an elevated lactic acid  She has a history of syncope per the son but did not syncopize yesterday  Hemoglobin is stable and actually increased it to 13 6 today  Lactic acid is slowly improving  White blood cell count decreased from 22 4 to 18 1 today  -Reviewed CT of abdomen with radiology  There is some atherosclerosis at the origin of the SMA and also in the celiac artery  He is unable to visualize the NENA well  He recommended either a repeat CT angiography versus a mesenteric duplex to further evaluate the abdominal vessels  We will order a mesenteric duplex for now  Pending results, can consider a vascular consult   -continue Zosyn IV  -continue NPO for now  Could consider clear liquids if patient can tolerate  She did have an episode of vomiting this morning   -rule out infectious etiology    C diff pending  -serial abdominal exams  -aggressive IV fluid hydration  -continue to monitor white blood cell count and temperature  ____________________________________________________________  10/23 Colorectal Surg Progress Note  Feels better, no pain, abdomen soft, nontender, tolerating clears  Lactate cleared 0 8, ongoing leukocytosis, electrolyte abnormalities  Continue plan per primary team and GI as consulted previous  Call if any new/recurrent concerns from surgical standpoint  _________________________________________________________________  10/23 Medicine Progress Note  Principal Problem:    GI bleed  Active Problems:    Near Syncope    Leukocytosis    Anemia, acute    Hyperglycemia    Lactic acid acidosis    Essential hypertension    Urinary retention    Hypothyroidism    Dementia      Lower GI bleeding secondary to Ischemic Colitis   CT of the abdomen and pelvis with intravenous contrast (no oral contrast was administered)- showed colonic wall thickening from the distal transverse colon to the anus   Suspect less likely infectious process  Patient has not had any diarrhea in the past 24 hours  Leukocytosis could be secondary to reactive inflammatory process  C diff PCR, ova parasite, stool enteric bacteria panel pending collection   However patient has not passed any stool  Continue Metronidazole  DC Zosyn  More likely ischemic colitis   Lactic acid initially 3 7 trended down  Continue intravenous normal saline  VAS Celiac and mesenteric duplex pending   Plan for elective colonoscopy once more stable  Patient currently on clear liquid diet  Has been tolerating it well  Continue Zofran for nausea as needed  Continue abdominal serial examinations  Patient had some mild tenderness in the left lower quadrant area today on deep palpation  GI and Colorectal following   Recommend outpatient colonoscopy  VAS celiac/mesenteric duplex pending       Presyncope  Likely related to acute blood loss from lower GI bleed  Patient is out of bed with assistance  Currently asymptomatic no dizziness  PT/OT evaluation pending        Anemia  Likely secondary to acute blood loss  Hemoglobin 10 8   Monitor CBC daily  Blood pressure has been stable  Monitor for hypotension     Hypertension  Continue amlodipine and valsartan  Blood pressures been stable 036-518 systolic       Hypothyroidism  TSH 2 210  With follow-up for to 4 weeks as outpatient  Continue Synthroid     Hx of Primary Biliary Cirrhosis  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 however is not currently on any regimen          Hx of Dementia  Per patient's son, he reports patient baseline mentation has declined over the years, patient is more forgetful  Jackie Medel patient does not carry an official diagnosis of dementia  Will conduct Montrial cognitive assessment today [facility])   Outpatient follow-up with Gerontology        Hyperglycemia  Blood sugar on admission was 223  UA shows glucosuria   Patient denies any polyuria, polyphagia, polydipsia  Patient does not have a diagnosis of diabetes  Patient's hemoglobin A1c was 5 6     Disposition:  Continue to monitor H&H and hemodynamics

## 2017-10-23 NOTE — PROGRESS NOTES
Progress Note - Colorectal Surgery   Jay Gilmore 80 y o  female MRN: [id number]  Unit/Bed#: [facility] 830-01 Encounter: [id number]    Assessment:  80yoF with distal ischemic colitis    Plan:  - monitor abdominal exam  - hyponatremia -- continue IV hydration  - clears, diet as tolerated  - trend hgb  - per GI, plan for c-scope if not improving, otherwise would benefit from elective c-scope  - analgesia per primary team  - DVT ppx -- SCDs    Subjective/Objective     Subjective: Patient denies pain  Reports blood in BM  Objective:     Vitals: Blood pressure 146/66, pulse 83, temperature 98 7 °F (37 1 °C), temperature source Oral, resp  rate 16, height 5' 1" (1 549 m), weight 60 3 kg (133 lb), SpO2 96 %  ,Body mass index is 25 13 kg/m²  I/O       10/21 0701 - 10/22 0700 10/22 0701 - 10/23 0700    P  O   345    I V  (mL/kg)  3621 2 (60 1)    IV Piggyback 1000 1000    Total Intake(mL/kg) 1000 (16 6) 4966 2 (82 4)    Urine (mL/kg/hr) 2030 1600 (1 1)    Stool 0     Total Output 2030 1600    Net -1030 +3366 2          Unmeasured Stool Occurrence 4 x           Physical Exam:  GEN: NAD  HEENT: MMM  CV: RRR  Lung: Normal effort  Ab: Soft, NT/ND  Extrem: No CCE  Neuro:  A+Ox3    Lab, Imaging and other studies:   CBC with diff:   Lab Results   Component Value Date    WBC 18 17 (H) 10/22/2017    HGB 11 4 (L) 10/22/2017    HCT 32 0 (L) 10/22/2017    MCV 91 10/22/2017     10/22/2017    MCH 31 8 10/22/2017    MCHC 34 9 10/22/2017    RDW 13 5 10/22/2017    MPV 8 8 (L) 10/22/2017   , BMP/CMP:   Lab Results   Component Value Date     (L) 10/22/2017    K 3 5 10/22/2017    CL 99 (L) 10/22/2017    CO2 20 (L) 10/22/2017    ANIONGAP 12 10/22/2017    BUN 7 10/22/2017    CREATININE 0 65 10/22/2017    GLUCOSE 129 10/22/2017    CALCIUM 8 3 10/22/2017    AST 77 (H) 10/22/2017     (H) 10/22/2017    ALKPHOS 104 10/22/2017    PROT 7 2 10/22/2017    ALBUMIN 3 3 (L) 10/22/2017    BILITOT 0 88 10/22/2017    EGFR 84 10/22/2017 , Magnesium: No components found for: MAG  VTE Pharmacologic Prophylaxis: Sequential compression device (Venodyne)   VTE Mechanical Prophylaxis: sequential compression device

## 2017-10-23 NOTE — PLAN OF CARE
DISCHARGE PLANNING     Discharge to home or other facility with appropriate resources Progressing        DISCHARGE PLANNING - CARE MANAGEMENT     Discharge to post-acute care or home with appropriate resources Progressing        GASTROINTESTINAL - ADULT     Minimal or absence of nausea and/or vomiting Progressing     Maintains or returns to baseline bowel function Progressing     Maintains adequate nutritional intake Progressing        INFECTION - ADULT     Absence or prevention of progression during hospitalization Progressing        Knowledge Deficit     Patient/family/caregiver demonstrates understanding of disease process, treatment plan, medications, and discharge instructions Progressing        PAIN - ADULT     Verbalizes/displays adequate comfort level or baseline comfort level Progressing        Potential for Falls     Patient will remain free of falls Progressing        SAFETY ADULT     Maintain or return to baseline ADL function Progressing     Maintain or return mobility status to optimal level Progressing
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
## 2017-10-23 NOTE — PROGRESS NOTES
IM Residency Progress Note   Unit/Bed#: [facility] 830-01 Encounter: [id number]  SOD Team B       Ranjan Pastor 80 y o  female [address] Stay Days: 2      Assessment/Plan:    Principal Problem:    GI bleed  Active Problems:    Near Syncope    Leukocytosis    Anemia, acute    Hyperglycemia    Lactic acid acidosis    Essential hypertension    Urinary retention    Hypothyroidism    Dementia      Lower GI bleeding secondary to Ischemic Colitis   CT of the abdomen and pelvis with intravenous contrast (no oral contrast was administered)- showed colonic wall thickening from the distal transverse colon to the anus   Suspect less likely infectious process  Patient has not had any diarrhea in the past 24 hours  Leukocytosis could be secondary to reactive inflammatory process  C diff PCR, ova parasite, stool enteric bacteria panel pending collection   However patient has not passed any stool  Continue Metronidazole  DC Zosyn  More likely ischemic colitis  Lactic acid initially 3 7 trended down  Continue intravenous normal saline  VAS Celiac and mesenteric duplex pending  Plan for elective colonoscopy once more stable  Patient currently on clear liquid diet  Has been tolerating it well  Continue Zofran for nausea as needed  Continue abdominal serial examinations  Patient had some mild tenderness in the left lower quadrant area today on deep palpation  GI and Colorectal following  Recommend outpatient colonoscopy  VAS celiac/mesenteric duplex pending       Presyncope  Likely related to acute blood loss from lower GI bleed  Patient is out of bed with assistance  Currently asymptomatic no dizziness  PT/OT evaluation pending        Anemia  Likely secondary to acute blood loss  Hemoglobin 10 8  Monitor CBC daily  Blood pressure has been stable   Monitor for hypotension       Hypertension  Continue amlodipine and valsartan  Blood pressures been stable 831-990 systolic       Hypothyroidism  TSH 2 210  With follow-up for to 4 weeks as outpatient  Continue Synthroid     Hx of Primary Biliary Cirrhosis  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 however is not currently on any regimen         Hx of Dementia  Per patient's son, he reports patient baseline mentation has declined over the years, patient is more forgetful  However patient does not carry an official diagnosis of dementia  [facility] - Patient scored 19/30 classifying as mild dementia  Outpatient follow-up with Gerontology          Hyperglycemia  Blood sugar on admission was 223  UA shows glucosuria  Patient denies any polyuria, polyphagia, polydipsia  Patient does not have a diagnosis of diabetes  Patient's hemoglobin A1c was 5 6     Disposition:  Continue to monitor H&H and hemodynamics  Subjective:   Patient seen and examined at bedside  Patient states then she feels well this morning  She denies any nausea, vomiting, abdominal pain dizziness, chest pain, palpitations, sob, dizziness  She has been tolerating the clear liquid diet well  No other complaints this am  All other ROS was negative  Vitals: Temp (24hrs), Av 5 °F (36 9 °C), Min:97 7 °F (36 5 °C), Max:99 1 °F (37 3 °C)  Current: Temperature: 98 7 °F (37 1 °C)  Vitals:    10/22/17 2300 10/23/17 0351 10/23/17 0400 10/23/17 0600   BP: 140/64  146/66    Pulse: 87  83    Resp: 18  16    Temp: 97 7 °F (36 5 °C)  98 7 °F (37 1 °C)    TempSrc: Oral  Oral    SpO2: 96% 96% 96%    Weight:    62 7 kg (138 lb 3 7 oz)   Height:        Body mass index is 26 12 kg/m²  I/O last 24 hours:   In: 4966 2 [P O :345; I V :3621 2; IV Piggyback:1000]  Out: 1600 [Urine:1600]      Physical Exam: /80   Pulse 101   Temp 98 1 °F (36 7 °C) (Oral)   Resp 18   Ht 5' 1" (1 549 m)   Wt 62 7 kg (138 lb 3 7 oz)   SpO2 99%   BMI 26 12 kg/m²   General appearance: alert, appears stated age and cooperative  Head: Normocephalic, without obvious abnormality, atraumatic  Lungs: clear to auscultation bilaterally  Heart: regular rate and rhythm, S1, S2 normal, no murmur, click, rub or gallop  Abdomen: Mild tenderness in the left lower quadrant area on deep palpation  Nondistended, normal bowel sounds  Extremities: extremities normal, atraumatic, no cyanosis or edema  Pulses: 2+ and symmetric  Neuro: alert and oriented  Grossly normal      Invasive Devices     Peripheral Intravenous Line            Peripheral IV 10/21/17 Left Antecubital 1 day                          Labs:   Recent Results (from the past 24 hour(s))   Hemoglobin and hematocrit, blood    Collection Time: 10/22/17  1:49 PM   Result Value Ref Range    Hemoglobin 11 4 (L) 11 5 - 15 4 g/dL    Hematocrit 32 0 (L) 34 8 - 46 1 %   Comprehensive metabolic panel    Collection Time: 10/23/17  6:05 AM   Result Value Ref Range    Sodium 134 (L) 136 - 145 mmol/L    Potassium 3 0 (L) 3 5 - 5 3 mmol/L    Chloride 103 100 - 108 mmol/L    CO2 21 21 - 32 mmol/L    Anion Gap 10 4 - 13 mmol/L    BUN 4 (L) 5 - 25 mg/dL    Creatinine 0 54 (L) 0 60 - 1 30 mg/dL    Glucose 83 65 - 140 mg/dL    Calcium 8 1 (L) 8 3 - 10 1 mg/dL    AST 36 5 - 45 U/L    ALT 60 12 - 78 U/L    Alkaline Phosphatase 79 46 - 116 U/L    Total Protein 5 8 (L) 6 4 - 8 2 g/dL    Albumin 2 3 (L) 3 5 - 5 0 g/dL    Total Bilirubin 0 64 0 20 - 1 00 mg/dL    eGFR 89 ml/min/1 73sq m   CBC    Collection Time: 10/23/17  6:05 AM   Result Value Ref Range    WBC 22 90 (H) 4 31 - 10 16 Thousand/uL    RBC 3 40 (L) 3 81 - 5 12 Million/uL    Hemoglobin 10 8 (L) 11 5 - 15 4 g/dL    Hematocrit 30 6 (L) 34 8 - 46 1 %    MCV 90 82 - 98 fL    MCH 31 8 26 8 - 34 3 pg    MCHC 35 3 31 4 - 37 4 g/dL    RDW 13 7 11 6 - 15 1 %    Platelets 943 372 - 051 Thousands/uL    MPV 8 4 (L) 8 9 - 12 7 fL       Radiology Results: I have personally reviewed pertinent reports  Other Diagnostic Testing:   I have personally reviewed pertinent reports          Active Meds:   Current Facility-Administered Medications   Medication Dose Route Frequency    amLODIPine (NORVASC) tablet 5 mg  5 mg Oral Daily    And    valsartan (DIOVAN) tablet 320 mg  320 mg Oral Daily    enoxaparin (LOVENOX) subcutaneous injection 40 mg  40 mg Subcutaneous Daily    levothyroxine tablet 88 mcg  88 mcg Oral Early Morning    metroNIDAZOLE (FLAGYL) IVPB (premix) 500 mg  500 mg Intravenous Q8H    ondansetron (ZOFRAN) injection 4 mg  4 mg Intravenous Q6H PRN    pantoprazole (PROTONIX) injection 40 mg  40 mg Intravenous Q24H JOSSELIN    piperacillin-tazobactam (ZOSYN) 3 375 g in sodium chloride 0 9 % 50 mL IVPB  3 375 g Intravenous Q6H    sodium chloride 0 9 % infusion  125 mL/hr Intravenous Continuous         VTE Pharmacologic Prophylaxis: Enoxaparin (Lovenox)  VTE Mechanical Prophylaxis: sequential compression device    LIBORIO Nix    Internal Medicine PGY-1  10/23/2017 8:10 AM  Pager: [phone]

## 2017-10-23 NOTE — PLAN OF CARE
Problem: PHYSICAL THERAPY ADULT  Goal: Performs mobility at highest level of function for planned discharge setting  See evaluation for individualized goals  Treatment/Interventions: Functional transfer training, LE strengthening/ROM, Elevations, Therapeutic exercise, Endurance training, Cognitive reorientation, Patient/family training, Equipment eval/education, Bed mobility, Gait training  Equipment Recommended: Chel [place] (pending progress)       See flowsheet documentation for full assessment, interventions and recommendations  Prognosis: Good  Problem List: Decreased strength, Decreased endurance, Impaired balance, Decreased mobility, Decreased cognition, Impaired judgement, Decreased safety awareness  Assessment: Pt is a 80 y o  female seen for PT evaluation s/p admit to [facility] on 10/21/2017 w/ GI bleed  Order placed for PT  Comorbidities affecting pt's physical performance listed above  Personal factors affecting pt at time of IE include: steps to enter environment, environment, limited home support, advanced age, inability to perform IADLs, inability to perform ADLs, limited insight into impairments and preferred language not [place] (language barrier)  Prior to admission, pt was independent w/ all functional mobility w/ out AD, lives in multi-level home and lives with son (works during the day)  Son reports that he does not believe he will be able to take care of her at home in her current state due to increasing level of assist needed  Upon evaluation: Pt requires SBA for bed mobility, min A for sit to stand, and min A for ambulation with RW  Pt is slightly impulsive and demonstrates decreased safety awareness  (Please find full objective findings from PT assessment regarding body systems outlined above)   Impairments and limitations also listed above, especially due to  weakness, impaired balance, decreased endurance, gait deviations, decreased activity tolerance, decreased safety awareness, impaired judgement, fall risk and decreased cognition  The following objective measures performed on IE also reveal limitations: Barthel Index 65/100  Pt's clinical presentation is currently unstable/unpredictable seen in pt's presentation of decreased cognition, impulsivity, decreased safety awareness/lack of insight into deficits, and abnormal lab values  Pt to benefit from continued skilled PT tx while in hospital and upon DC to address deficits as defined above and maximize level of functional mobility  From PT/mobility standpoint, recommendation at time of d/c would be Short term rehab vs  Long term placement pending progress/family need in order to maximize pt's functional independence and consistency w/ mobility in order to facilitate return to PLOF  Recommend progression of ambulation, initiation of HEP, and PT to see to assess stair negotiation as appropriate  Recommendation: Post acute IP rehab     PT - OK to Discharge: Yes (if to STR when medically appropriate)    See flowsheet documentation for full assessment

## 2017-10-24 PROBLEM — K52.9 COLITIS: Status: ACTIVE | Noted: 2017-10-21

## 2017-10-24 LAB
BASOPHILS # BLD AUTO: 0.03 THOUSANDS/ΜL (ref 0–0.1)
BASOPHILS NFR BLD AUTO: 0 % (ref 0–1)
C DIFF TOX GENS STL QL NAA+PROBE: NORMAL
CAMPYLOBACTER DNA SPEC NAA+PROBE: NORMAL
EOSINOPHIL # BLD AUTO: 0.13 THOUSAND/ΜL (ref 0–0.61)
EOSINOPHIL NFR BLD AUTO: 1 % (ref 0–6)
ERYTHROCYTE [DISTWIDTH] IN BLOOD BY AUTOMATED COUNT: 13.7 % (ref 11.6–15.1)
HCT VFR BLD AUTO: 33.5 % (ref 34.8–46.1)
HGB BLD-MCNC: 11.7 G/DL (ref 11.5–15.4)
LYMPHOCYTES # BLD AUTO: 2.6 THOUSANDS/ΜL (ref 0.6–4.47)
LYMPHOCYTES NFR BLD AUTO: 10 % (ref 14–44)
MCH RBC QN AUTO: 31.6 PG (ref 26.8–34.3)
MCHC RBC AUTO-ENTMCNC: 34.9 G/DL (ref 31.4–37.4)
MCV RBC AUTO: 91 FL (ref 82–98)
MONOCYTES # BLD AUTO: 1.33 THOUSAND/ΜL (ref 0.17–1.22)
MONOCYTES NFR BLD AUTO: 5 % (ref 4–12)
NEUTROPHILS # BLD AUTO: 20.79 THOUSANDS/ΜL (ref 1.85–7.62)
NEUTS SEG NFR BLD AUTO: 84 % (ref 43–75)
NRBC BLD AUTO-RTO: 0 /100 WBCS
PLATELET # BLD AUTO: 250 THOUSANDS/UL (ref 149–390)
PMV BLD AUTO: 8.7 FL (ref 8.9–12.7)
RBC # BLD AUTO: 3.7 MILLION/UL (ref 3.81–5.12)
SALMONELLA DNA SPEC QL NAA+PROBE: NORMAL
SHIGA TOXIN STX GENE SPEC NAA+PROBE: NORMAL
SHIGELLA DNA SPEC QL NAA+PROBE: NORMAL
WBC # BLD AUTO: 25.01 THOUSAND/UL (ref 4.31–10.16)

## 2017-10-24 PROCEDURE — 85025 COMPLETE CBC W/AUTO DIFF WBC: CPT | Performed by: INTERNAL MEDICINE

## 2017-10-24 PROCEDURE — C9113 INJ PANTOPRAZOLE SODIUM, VIA: HCPCS | Performed by: SURGERY

## 2017-10-24 RX ADMIN — SODIUM CHLORIDE 125 ML/HR: 0.9 INJECTION, SOLUTION INTRAVENOUS at 17:26

## 2017-10-24 RX ADMIN — ONDANSETRON 4 MG: 2 INJECTION INTRAMUSCULAR; INTRAVENOUS at 17:25

## 2017-10-24 RX ADMIN — POLYETHYLENE GLYCOL 3350, SODIUM SULFATE ANHYDROUS, SODIUM BICARBONATE, SODIUM CHLORIDE, POTASSIUM CHLORIDE 4000 ML: 236; 22.74; 6.74; 5.86; 2.97 POWDER, FOR SOLUTION ORAL at 22:34

## 2017-10-24 RX ADMIN — VALSARTAN 320 MG: 160 TABLET ORAL at 08:30

## 2017-10-24 RX ADMIN — ENOXAPARIN SODIUM 40 MG: 40 INJECTION SUBCUTANEOUS at 08:30

## 2017-10-24 RX ADMIN — SODIUM CHLORIDE 125 ML/HR: 0.9 INJECTION, SOLUTION INTRAVENOUS at 08:30

## 2017-10-24 RX ADMIN — LEVOTHYROXINE SODIUM 88 MCG: 88 TABLET ORAL at 05:16

## 2017-10-24 RX ADMIN — AMLODIPINE BESYLATE 5 MG: 5 TABLET ORAL at 08:30

## 2017-10-24 RX ADMIN — PANTOPRAZOLE SODIUM 40 MG: 40 INJECTION, POWDER, FOR SOLUTION INTRAVENOUS at 08:30

## 2017-10-24 RX ADMIN — BISACODYL 10 MG: 5 TABLET, COATED ORAL at 22:41

## 2017-10-24 RX ADMIN — METRONIDAZOLE 500 MG: 500 INJECTION, SOLUTION INTRAVENOUS at 01:56

## 2017-10-24 NOTE — PROGRESS NOTES
Progress Note - General Surgery   Stanislaw Parekh 80 y o  female MRN: [id number]  Unit/Bed#: [facility] 830-01 Encounter: [id number]    Assessment:  Patient is an 80-year-old female from [facility] who presents with a GI bleed    Plan:  Continue clears  Hg stable, continue to monitor  F/u stool ova/parasites  C  Diff and stool panel negative  Leukocytosis 22 9-->25    Subjective/Objective   Chief Complaint:     Subjective: AMOS  3 BM yesterday  Nursing reports no blood in stool  Patient has mild abdominal pain  No N/V/F/C  Objective:     Blood pressure 142/67, pulse 87, temperature 98 1 °F (36 7 °C), temperature source Oral, resp  rate 20, height 5' 1" (1 549 m), weight 62 7 kg (138 lb 3 7 oz), SpO2 95 %  ,Body mass index is 26 12 kg/m²  Intake/Output Summary (Last 24 hours) at 10/24/17 1001  Last data filed at 10/23/17 1855   Gross per 24 hour   Intake          1545 41 ml   Output              600 ml   Net           945 41 ml       Invasive Devices     Peripheral Intravenous Line            Peripheral IV 10/24/17 Right Antecubital less than 1 day                Physical Exam: NAD  AAOX3  RRR  Normal respiratory effort  Soft, TTP LLQ, ND  No c/c/e    Lab, Imaging and other studies:  I have personally reviewed pertinent lab results    , CBC:   Lab Results   Component Value Date    WBC 25 01 (H) 10/24/2017    HGB 11 7 10/24/2017    HCT 33 5 (L) 10/24/2017    MCV 91 10/24/2017     10/24/2017    MCH 31 6 10/24/2017    MCHC 34 9 10/24/2017    RDW 13 7 10/24/2017    MPV 8 7 (L) 10/24/2017    NRBC 0 10/24/2017     VTE Pharmacologic Prophylaxis: Enoxaparin (Lovenox)  VTE Mechanical Prophylaxis: sequential compression device

## 2017-10-24 NOTE — SOCIAL WORK
CM received a call from Anamika Macias and she aware [address] has a bed and [facility] evaluating   Anamika Macias requested referral to [facility] , referral in [facility] as such   [facility] is first choice , cm called Sarah Morris and aware   Cm will follow

## 2017-10-24 NOTE — SOCIAL WORK
CM called Antonio Stovall and left a voice message   Antonio Stovall aware [facility] does not have a female bed at this time  Antonio Stovall aware [address] accepted and [facility] still reviewing   Cm awaiting a return call with first choice

## 2017-10-24 NOTE — PROGRESS NOTES
IM Residency Progress Note   Unit/Bed#: [facility] 830-01 Encounter: [id number]  SOD Team B       Claudell Clipper 80 y o  female [address] Stay Days: 3      Assessment/Plan:    Principal Problem:    GI bleed  Active Problems:    Near Syncope    Leukocytosis    Anemia, acute    Hyperglycemia    Lactic acid acidosis    Essential hypertension    Urinary retention    Hypothyroidism    Dementia    Lower GI bleeding secondary to Ischemic Colitis   CT of the abdomen and pelvis with intravenous contrast (no oral contrast was administered)- showed colonic wall thickening from the distal transverse colon to the anus   Leukocytosis could be secondary to reactive inflammatory process, trending up  WBC 25 today  OK to DC Metronidazole  DC Zosyn  C diff was negative, ova and parasite negative, enteric bacteria negative  More likely ischemic colitis   Lactic acid initially 3 7 trended down  Continue intravenous normal saline  VAS Celiac and mesenteric duplex was normal     Patient currently on clear liquid diet  Has not been tolerating well  Was having non painful diarrhea after she eats, however now they've increased in frequency  (6-7 times) and associated with rectal pain, likely secondary to irritation  Questionable blood in stool per nurse staff and patient  Patient had some mild tenderness in the left lower quadrant area today on deep palpation  Continue abdominal serial examinations  GI and Colorectal following        Presyncope  Likely related to acute blood loss from lower GI bleed  Patient is out of bed with assistance  Currently asymptomatic no dizziness  PT/OT evaluation says needs assistance with ADL and functional mobility  Recommend short-term rehab  However family want patient to go to an assisted living facility        Anemia  Likely secondary to acute blood loss  Hemoglobin 11 7  Monitor CBC daily  Blood pressure has been stable  Monitor for hypotension     Hypertension  Continue amlodipine and valsartan  Blood pressures been stable 709-684 systolic       Hypothyroidism  TSH 2 210  With follow-up for to 4 weeks as outpatient  Continue Synthroid     Hx of Primary Biliary Cirrhosis  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 however is not currently on any regimen        Dementia   Montrial cognitive assessment today [facility] [place] - Patient scored 19/30 classifying as mild dementia  Outpatient follow-up with Gerontology          Hyperglycemia  Resolved  Blood sugar on admission was 223  UA shows glucosuria   Patient denies any polyuria, polyphagia, polydipsia  Patient does not have a diagnosis of diabetes  Patient's hemoglobin A1c was 5 6     Disposition:  Continue to monitor H&H and hemodynamics         Subjective:   Patient seen and examined  Patient states that today she is still having frequent bouts of diarrhea, which she believes to be bloody in nature  Patient also has some rectal discomfort after she has a bowel movement  Patient denies any fever, chills , nausea, vomiting, abdominal pain or lightheadedness  Patient states that she does not have any appetite or hunger  She does say after she ate she does get urge to defecate  All other review of system was negative at this time  Vitals: Temp (24hrs), Av 4 °F (36 9 °C), Min:98 1 °F (36 7 °C), Max:99 °F (37 2 °C)  Current: Temperature: 98 7 °F (37 1 °C)  Vitals:    10/23/17 1102 10/23/17 1610 10/23/17 1900 10/23/17 2300   BP:  120/76 144/62 136/61   Pulse: 101 77 93 91   Resp:  20 20 20   Temp:  98 2 °F (36 8 °C) 99 °F (37 2 °C) 98 7 °F (37 1 °C)   TempSrc:  Oral Oral Oral   SpO2: 99% 96% 96% 98%   Weight:       Height:        Body mass index is 26 12 kg/m²  I/O last 24 hours:   In: 2103 7 [P O :260; I V :1443 7; IV Piggyback:400]  Out: 600 [Urine:600]      Physical Exam: /67   Pulse 87   Temp 98 1 °F (36 7 °C) (Oral)   Resp 20   Ht 5' 1" (1 549 m)   Wt 62 7 kg (138 lb 3 7 oz)   SpO2 95%   BMI 26 12 kg/m²   General appearance: alert, appears stated age and cooperative  HEENT: Normocephalic, without obvious abnormality, atraumatic, moist mucous membranes  Heart: Normal S1/S2 regular rate and rhythm  Lungs: clear to auscultation bilaterally  Abdomen: Tenderness to deep palpation LLQ, normal bowel sounds  Non distended  Pulses: 2+ and symmetric     Invasive Devices     Peripheral Intravenous Line            Peripheral IV 10/21/17 Left Antecubital 2 days                          Labs:   Recent Results (from the past 24 hour(s))   Lactic acid, plasma    Collection Time: 10/23/17  7:49 AM   Result Value Ref Range    LACTIC ACID 0 8 0 5 - 2 0 mmol/L   Clostridium difficile toxin by PCR    Collection Time: 10/23/17  2:20 PM   Result Value Ref Range    C difficile toxin by PCR NEGATIVE for C difficle toxin by PCR  NEGATIVE for C difficle toxin by PCR      Stool Enteric Bacterial Panel by PCR    Collection Time: 10/23/17  2:20 PM   Result Value Ref Range    Salmonella sp PCR None Detected None Detected    Shigella sp/Enteroinvasive E  coli (EIEC) PCR None Detected None Detected    Campylobacter sp (jejuni and coli) PCR None Detected None Detected    Shiga toxin 1/Shiga tonix 2 genes PCR None Detected None Detected   CBC and differential    Collection Time: 10/24/17  5:04 AM   Result Value Ref Range    WBC 25 01 (H) 4 31 - 10 16 Thousand/uL    RBC 3 70 (L) 3 81 - 5 12 Million/uL    Hemoglobin 11 7 11 5 - 15 4 g/dL    Hematocrit 33 5 (L) 34 8 - 46 1 %    MCV 91 82 - 98 fL    MCH 31 6 26 8 - 34 3 pg    MCHC 34 9 31 4 - 37 4 g/dL    RDW 13 7 11 6 - 15 1 %    MPV 8 7 (L) 8 9 - 12 7 fL    Platelets 113 916 - 406 Thousands/uL    nRBC 0 /100 WBCs    Neutrophils Relative 84 (H) 43 - 75 %    Lymphocytes Relative 10 (L) 14 - 44 %    Monocytes Relative 5 4 - 12 %    Eosinophils Relative 1 0 - 6 %    Basophils Relative 0 0 - 1 %    Neutrophils Absolute 20 79 (H) 1 85 - 7 62 Thousands/µL    Lymphocytes Absolute 2 60 0 60 - 4 47 Thousands/µL    Monocytes Absolute 1 33 (H) 0 17 - 1 22 Thousand/µL    Eosinophils Absolute 0 13 0 00 - 0 61 Thousand/µL    Basophils Absolute 0 03 0 00 - 0 10 Thousands/µL       Radiology Results: I have personally reviewed pertinent reports  Other Diagnostic Testing:   I have personally reviewed pertinent reports  Active Meds:   Current Facility-Administered Medications   Medication Dose Route Frequency    amLODIPine (NORVASC) tablet 5 mg  5 mg Oral Daily    And    valsartan (DIOVAN) tablet 320 mg  320 mg Oral Daily    enoxaparin (LOVENOX) subcutaneous injection 40 mg  40 mg Subcutaneous Daily    levothyroxine tablet 88 mcg  88 mcg Oral Early Morning    metroNIDAZOLE (FLAGYL) IVPB (premix) 500 mg  500 mg Intravenous Q8H    ondansetron (ZOFRAN) injection 4 mg  4 mg Intravenous Q6H PRN    pantoprazole (PROTONIX) injection 40 mg  40 mg Intravenous Q24H JOSSELIN    sodium chloride 0 9 % infusion  125 mL/hr Intravenous Continuous         VTE Pharmacologic Prophylaxis: Enoxaparin (Lovenox)  VTE Mechanical Prophylaxis: sequential compression device    LIBORIO Arnold    Internal Medicine PGY-1  10/24/2017 1:59 PM

## 2017-10-25 ENCOUNTER — ANESTHESIA EVENT (INPATIENT)
Dept: GASTROENTEROLOGY | Facility: HOSPITAL | Age: 82
DRG: 372 | End: 2017-10-25
Payer: MEDICARE

## 2017-10-25 LAB
ANION GAP SERPL CALCULATED.3IONS-SCNC: 10 MMOL/L (ref 4–13)
BASOPHILS # BLD AUTO: 0.03 THOUSANDS/ΜL (ref 0–0.1)
BASOPHILS NFR BLD AUTO: 0 % (ref 0–1)
BUN SERPL-MCNC: 2 MG/DL (ref 5–25)
CALCIUM SERPL-MCNC: 8.2 MG/DL (ref 8.3–10.1)
CHLORIDE SERPL-SCNC: 105 MMOL/L (ref 100–108)
CO2 SERPL-SCNC: 22 MMOL/L (ref 21–32)
CREAT SERPL-MCNC: 0.5 MG/DL (ref 0.6–1.3)
EOSINOPHIL # BLD AUTO: 0.1 THOUSAND/ΜL (ref 0–0.61)
EOSINOPHIL NFR BLD AUTO: 1 % (ref 0–6)
ERYTHROCYTE [DISTWIDTH] IN BLOOD BY AUTOMATED COUNT: 13.8 % (ref 11.6–15.1)
GFR SERPL CREATININE-BSD FRML MDRD: 91 ML/MIN/1.73SQ M
GLUCOSE SERPL-MCNC: 96 MG/DL (ref 65–140)
HCT VFR BLD AUTO: 31.1 % (ref 34.8–46.1)
HGB BLD-MCNC: 11 G/DL (ref 11.5–15.4)
LYMPHOCYTES # BLD AUTO: 1.74 THOUSANDS/ΜL (ref 0.6–4.47)
LYMPHOCYTES NFR BLD AUTO: 9 % (ref 14–44)
MCH RBC QN AUTO: 32.1 PG (ref 26.8–34.3)
MCHC RBC AUTO-ENTMCNC: 35.4 G/DL (ref 31.4–37.4)
MCV RBC AUTO: 91 FL (ref 82–98)
MONOCYTES # BLD AUTO: 1.3 THOUSAND/ΜL (ref 0.17–1.22)
MONOCYTES NFR BLD AUTO: 6 % (ref 4–12)
NEUTROPHILS # BLD AUTO: 17.12 THOUSANDS/ΜL (ref 1.85–7.62)
NEUTS SEG NFR BLD AUTO: 84 % (ref 43–75)
NRBC BLD AUTO-RTO: 0 /100 WBCS
PLATELET # BLD AUTO: 273 THOUSANDS/UL (ref 149–390)
PMV BLD AUTO: 8.7 FL (ref 8.9–12.7)
POTASSIUM SERPL-SCNC: 2.8 MMOL/L (ref 3.5–5.3)
POTASSIUM SERPL-SCNC: 3.1 MMOL/L (ref 3.5–5.3)
RBC # BLD AUTO: 3.43 MILLION/UL (ref 3.81–5.12)
SODIUM SERPL-SCNC: 137 MMOL/L (ref 136–145)
WBC # BLD AUTO: 20.4 THOUSAND/UL (ref 4.31–10.16)

## 2017-10-25 PROCEDURE — 85025 COMPLETE CBC W/AUTO DIFF WBC: CPT | Performed by: INTERNAL MEDICINE

## 2017-10-25 PROCEDURE — 97116 GAIT TRAINING THERAPY: CPT

## 2017-10-25 PROCEDURE — C9113 INJ PANTOPRAZOLE SODIUM, VIA: HCPCS | Performed by: SURGERY

## 2017-10-25 PROCEDURE — 84132 ASSAY OF SERUM POTASSIUM: CPT | Performed by: INTERNAL MEDICINE

## 2017-10-25 PROCEDURE — 80048 BASIC METABOLIC PNL TOTAL CA: CPT | Performed by: INTERNAL MEDICINE

## 2017-10-25 RX ORDER — POTASSIUM CHLORIDE 14.9 MG/ML
20 INJECTION INTRAVENOUS
Status: COMPLETED | OUTPATIENT
Start: 2017-10-25 | End: 2017-10-25

## 2017-10-25 RX ORDER — POLYETHYLENE GLYCOL 3350 17 G/17G
238 POWDER, FOR SOLUTION ORAL ONCE
Status: COMPLETED | OUTPATIENT
Start: 2017-10-25 | End: 2017-10-25

## 2017-10-25 RX ORDER — POTASSIUM CHLORIDE 20 MEQ/1
40 TABLET, EXTENDED RELEASE ORAL ONCE
Status: COMPLETED | OUTPATIENT
Start: 2017-10-25 | End: 2017-10-25

## 2017-10-25 RX ORDER — SODIUM CHLORIDE, SODIUM LACTATE, POTASSIUM CHLORIDE, CALCIUM CHLORIDE 600; 310; 30; 20 MG/100ML; MG/100ML; MG/100ML; MG/100ML
20 INJECTION, SOLUTION INTRAVENOUS CONTINUOUS
Status: CANCELLED | OUTPATIENT
Start: 2017-10-25

## 2017-10-25 RX ORDER — POTASSIUM CHLORIDE 29.8 MG/ML
40 INJECTION INTRAVENOUS ONCE
Status: DISCONTINUED | OUTPATIENT
Start: 2017-10-25 | End: 2017-10-25

## 2017-10-25 RX ORDER — POTASSIUM CHLORIDE 20 MEQ/1
40 TABLET, EXTENDED RELEASE ORAL ONCE
Status: DISCONTINUED | OUTPATIENT
Start: 2017-10-25 | End: 2017-10-25

## 2017-10-25 RX ORDER — POLYETHYLENE GLYCOL 3350 17 G/17G
238 POWDER, FOR SOLUTION ORAL ONCE
Status: DISCONTINUED | OUTPATIENT
Start: 2017-10-25 | End: 2017-10-25

## 2017-10-25 RX ADMIN — ENOXAPARIN SODIUM 40 MG: 40 INJECTION SUBCUTANEOUS at 08:20

## 2017-10-25 RX ADMIN — SODIUM CHLORIDE 125 ML/HR: 0.9 INJECTION, SOLUTION INTRAVENOUS at 10:45

## 2017-10-25 RX ADMIN — POTASSIUM CHLORIDE 20 MEQ: 200 INJECTION, SOLUTION INTRAVENOUS at 14:21

## 2017-10-25 RX ADMIN — PANTOPRAZOLE SODIUM 40 MG: 40 INJECTION, POWDER, FOR SOLUTION INTRAVENOUS at 08:23

## 2017-10-25 RX ADMIN — LEVOTHYROXINE SODIUM 88 MCG: 88 TABLET ORAL at 06:12

## 2017-10-25 RX ADMIN — AMLODIPINE BESYLATE 5 MG: 5 TABLET ORAL at 08:22

## 2017-10-25 RX ADMIN — POTASSIUM CHLORIDE 40 MEQ: 1500 TABLET, EXTENDED RELEASE ORAL at 14:21

## 2017-10-25 RX ADMIN — POTASSIUM CHLORIDE 20 MEQ: 200 INJECTION, SOLUTION INTRAVENOUS at 16:31

## 2017-10-25 RX ADMIN — SODIUM CHLORIDE 125 ML/HR: 0.9 INJECTION, SOLUTION INTRAVENOUS at 18:45

## 2017-10-25 RX ADMIN — SODIUM CHLORIDE 125 ML/HR: 0.9 INJECTION, SOLUTION INTRAVENOUS at 02:29

## 2017-10-25 RX ADMIN — VALSARTAN 320 MG: 160 TABLET ORAL at 08:22

## 2017-10-25 RX ADMIN — POLYETHYLENE GLYCOL 3350 238 G: 17 POWDER, FOR SOLUTION ORAL at 16:51

## 2017-10-25 NOTE — CASE MANAGEMENT
Continued Stay Review    Date: 10/25/17    Vital Signs: /70   Pulse 87   Temp 98 9 °F (37 2 °C) (Oral)   Resp 18   Ht 5' 1" (1 549 m)   Wt 66 6 kg (146 lb 13 2 oz)   SpO2 96%   BMI 27 74 kg/m²     Medications:   Scheduled Meds:   amLODIPine 5 mg Oral Daily   And      valsartan 320 mg Oral Daily   enoxaparin 40 mg Subcutaneous Daily   levothyroxine 88 mcg Oral Early Morning   pantoprazole 40 mg Intravenous Q24H [facility]   potassium chloride 20 mEq Intravenous Q2H     Continuous Infusions:   sodium chloride 125 mL/hr Last Rate: 125 mL/hr (10/25/17 1045)     PRN Meds: ondansetron    Abnormal Labs:  Potassium 3 5 - 5 3 mmol/L 3 1    BUN 5 - 25 mg/dL 2    Creatinine 0 60 - 1 30 mg/dL 0 50      Potassium 3 5 - 5 3 mmol/L 2 8      WBC 4 31 - 10 16 Thousand/uL 20 40    RBC 3 81 - 5 12 Million/uL 3 43    Hemoglobin 11 5 - 15 4 g/dL 11 0    Hematocrit 34 8 - 46 1 % 31 1    MPV 8 9 - 12 7 fL 8 7    Neutrophils Relative 43 - 75 % 84      Diagnostic Results:     Age/Sex: 80 y o  female     Assessment/Plan:   Principal Problem:    GI bleed  Active Problems:    Near Syncope    Leukocytosis    Anemia, acute    Hyperglycemia    Lactic acid acidosis    Essential hypertension    Urinary retention    Hypothyroidism    Dementia    Colitis     Lower GI bleeding secondary to Ischemic Colitis   CT of the abdomen and pelvis with intravenous contrast showed colonic wall thickening from the distal transverse colon to the anus   Leukocytosis could be secondary to reactive inflammatory process, trending up  Antibiotics were discontinued- C diff was negative, enteric bacteria negative  Ova and parasite pending  Lactic acid initially 3 7, trended down  Continue intravenous normal saline  VAS Celiac and mesenteric duplex was normal     Patient currently on clear liquid diet  GI and Colorectal following     Colonoscopy scheduled for A  M       Presyncope  Likely related to acute blood loss from lower GI bleed     Patient is out of bed with assistance   Currently asymptomatic no dizziness  PT/OT evaluation says needs assistance with ADL and functional mobility   Recommend short-term rehab  [facility] IRMA family want patient to go to an assisted living facility        Anemia  Likely secondary to acute blood loss  Hemoglobin has been stable 11  Monitor CBC daily     Blood pressure has been stable  Monitor for hypotension       Hypertension  Continue amlodipine and valsartan  Blood pressures been stable 626-359 systolic       Hypothyroidism  TSH 2 210  With follow-up for to 2-4 weeks as outpatient  Continue Synthroid     Hx of Primary Biliary Cirrhosis  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 however is not currently on any regimen  Patient used to take Doug Nawaf some years ago, however not currently on this        D[facility]   [facility] - Patient scored 19/30 classifying as mild dementia  Outpatient follow-up with Gerontology         Hyperglycemia  Resolved  Blood sugar on admission was 223  UA shows glucosuria   Patient denies any polyuria, polyphagia, polydipsia  Patient does not have a diagnosis of diabetes  Patient's hemoglobin A1c was 5 6      Disposition: Awaiting colonoscopy procedure today       Discharge Plan: TBD

## 2017-10-25 NOTE — PROGRESS NOTES
IM Residency Progress Note   Unit/Bed#: [facility] 830-01 Encounter: [id number]  SOD Team B       Adam Puente 80 y o  female [address] Stay Days: 3      Assessment/Plan:    Principal Problem:    GI bleed  Active Problems:    Near Syncope    Leukocytosis    Anemia, acute    Hyperglycemia    Lactic acid acidosis    Essential hypertension    Urinary retention    Hypothyroidism    Dementia    Colitis    Lower GI bleeding secondary to Ischemic Colitis   CT of the abdomen and pelvis with intravenous contrast showed colonic wall thickening from the distal transverse colon to the anus   Leukocytosis could be secondary to reactive inflammatory process, trending up  Antibiotics were discontinued- C diff was negative, enteric bacteria negative  Ova and parasite pending  Lactic acid initially 3 7, trended down  Continue intravenous normal saline  VAS Celiac and mesenteric duplex was normal     Patient currently on clear liquid diet  GI and Colorectal following     Colonoscopy scheduled for A  M       Presyncope  Likely related to acute blood loss from lower GI bleed  Patient is out of bed with assistance   Currently asymptomatic no dizziness  PT/OT evaluation says needs assistance with ADL and functional mobility  Recommend short-term rehab  However family want patient to go to an assisted living facility        Anemia  Likely secondary to acute blood loss  Hemoglobin has been stable 11  Monitor CBC daily     Blood pressure has been stable  Monitor for hypotension       Hypertension  Continue amlodipine and valsartan  Blood pressures been stable 871-131 systolic       Hypothyroidism  TSH 2 210  With follow-up for to 2-4 weeks as outpatient  Continue Synthroid     Hx of Primary Biliary Cirrhosis  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 however is not currently on any regimen     Patient used to take Bethanne Budds some years ago, however not currently on this        Dementia   Montrial cognitive assessment today [facility]) - Patient scored 19/30 classifying as mild dementia  Outpatient follow-up with Gerontology           Hyperglycemia  Resolved  Blood sugar on admission was 223  UA shows glucosuria   Patient denies any polyuria, polyphagia, polydipsia  Patient does not have a diagnosis of diabetes  Patient's hemoglobin A1c was 5 6       Disposition: Awaiting colonoscopy procedure today  Subjective:   Patient seen and examined this morning  Patient was unable to finish the GoLYTELY prep for colonoscopy today  Patient denies any vomiting or nausea associated with the prep patient admits to frequent bowel movements throughout the night mainly nonbloody diarrhea  Patient still complains of rectal pain due to frequent bowel movements  She denies any fevers, chills, abdominal pain, chest pain palpitations  All other review of system was negative  Vitals: Temp (24hrs), Av 5 °F (36 9 °C), Min:98 1 °F (36 7 °C), Max:98 7 °F (37 1 °C)  Current: Temperature: 98 7 °F (37 1 °C)  Vitals:    10/23/17 1900 10/23/17 2300 10/24/17 0811 10/24/17 1500   BP: 144/62 136/61 142/67 143/65   Pulse: 93 91 87 85   Resp: 20 20 20 18   Temp: 99 °F (37 2 °C) 98 7 °F (37 1 °C) 98 1 °F (36 7 °C) 98 7 °F (37 1 °C)   TempSrc: Oral Oral Oral Oral   SpO2: 96% 98% 95% 97%   Weight:       Height:        Body mass index is 26 12 kg/m²  I/O last 24 hours: In: 3288 8 [P O :370; I V :2918 8]  Out: -       Physical Exam: /70   Pulse 87   Temp 98 9 °F (37 2 °C) (Oral)   Resp 18   Ht 5' 1" (1 549 m)   Wt 66 6 kg (146 lb 13 2 oz)   SpO2 96%   BMI 27 74 kg/m²   General appearance: alert, appears stated age and cooperative  Head: Normocephalic, without obvious abnormality, atraumatic, moist mucous membranes     Lungs: clear to auscultation bilaterally  Heart: regular rate and rhythm, S1, S2 normal, no murmur, click, rub or gallop  Abdomen: Nondistended, nontender, hyperactive bowel sounds  Extremities: extremities normal, atraumatic, no cyanosis or edema     Invasive Devices     Peripheral Intravenous Line            Peripheral IV 10/24/17 Right Antecubital less than 1 day                          Labs:   Recent Results (from the past 24 hour(s))   CBC and differential    Collection Time: 10/24/17  5:04 AM   Result Value Ref Range    WBC 25 01 (H) 4 31 - 10 16 Thousand/uL    RBC 3 70 (L) 3 81 - 5 12 Million/uL    Hemoglobin 11 7 11 5 - 15 4 g/dL    Hematocrit 33 5 (L) 34 8 - 46 1 %    MCV 91 82 - 98 fL    MCH 31 6 26 8 - 34 3 pg    MCHC 34 9 31 4 - 37 4 g/dL    RDW 13 7 11 6 - 15 1 %    MPV 8 7 (L) 8 9 - 12 7 fL    Platelets 905 701 - 147 Thousands/uL    nRBC 0 /100 WBCs    Neutrophils Relative 84 (H) 43 - 75 %    Lymphocytes Relative 10 (L) 14 - 44 %    Monocytes Relative 5 4 - 12 %    Eosinophils Relative 1 0 - 6 %    Basophils Relative 0 0 - 1 %    Neutrophils Absolute 20 79 (H) 1 85 - 7 62 Thousands/µL    Lymphocytes Absolute 2 60 0 60 - 4 47 Thousands/µL    Monocytes Absolute 1 33 (H) 0 17 - 1 22 Thousand/µL    Eosinophils Absolute 0 13 0 00 - 0 61 Thousand/µL    Basophils Absolute 0 03 0 00 - 0 10 Thousands/µL       Radiology Results: I have personally reviewed pertinent reports  Other Diagnostic Testing:   I have personally reviewed pertinent reports          Active Meds:   Current Facility-Administered Medications   Medication Dose Route Frequency    amLODIPine (NORVASC) tablet 5 mg  5 mg Oral Daily    And    valsartan (DIOVAN) tablet 320 mg  320 mg Oral Daily    bisacodyl (DULCOLAX) EC tablet 10 mg  10 mg Oral Once    enoxaparin (LOVENOX) subcutaneous injection 40 mg  40 mg Subcutaneous Daily    levothyroxine tablet 88 mcg  88 mcg Oral Early Morning    ondansetron (ZOFRAN) injection 4 mg  4 mg Intravenous Q6H PRN    pantoprazole (PROTONIX) injection 40 mg  40 mg Intravenous Q24H JOSSELIN    polyethylene glycol (GOLYTELY) bowel prep 4,000 mL  4,000 mL Oral Once    sodium chloride 0 9 % infusion 125 mL/hr Intravenous Continuous         VTE Pharmacologic Prophylaxis: Enoxaparin (Lovenox)  VTE Mechanical Prophylaxis: sequential compression device     LIBORIO Aguilar    Internal Medicine PGY-1  Pager [phone]

## 2017-10-25 NOTE — PROGRESS NOTES
Saqib Nava PA-C with GI notified that the patient did not complete her bowel preparation for her scheduled colonoscopy today  She will come up and see the patient today  Colonoscopy will be cancelled

## 2017-10-25 NOTE — PLAN OF CARE
Problem: PHYSICAL THERAPY ADULT  Goal: Performs mobility at highest level of function for planned discharge setting  See evaluation for individualized goals  Treatment/Interventions: Functional transfer training, LE strengthening/ROM, Elevations, Therapeutic exercise, Endurance training, Cognitive reorientation, Patient/family training, Equipment eval/education, Bed mobility, Gait training  Equipment Recommended: Dianna Chávez (pending progress)       See flowsheet documentation for full assessment, interventions and recommendations  Prognosis: Good  Problem List: Decreased strength, Decreased endurance, Impaired balance, Decreased mobility, Decreased cognition, Impaired judgement, Decreased safety awareness  Assessment: Pt seen for physical therapy treatment consisting of ambulation on level surfaces and ambulation on stairs  Pt required close supervision -CG assist for all mobility activities  Pt has been temporarily living with her son in the [facility], however pt's son works full time and is not home during the day to provide assistance to pt  Pt was fully independent with all mobility activities prior to this admission as per family report  (Due to language barrier )  Pt is below her baseline level of independence for all mobility activities at this time as she requires assist and vc's for maximum safety  Recommend d/c to inpatient rehab when medically ready for d/c    Barriers to Discharge: Decreased caregiver support, Inaccessible home environment     Recommendation: Post acute IP rehab     PT - OK to Discharge: Yes    See flowsheet documentation for full assessment

## 2017-10-25 NOTE — NUTRITION
10/25/17 7006   Recommendations/Interventions   Interventions Other (comment)  (RD does not have protocol to adjust diet/supplements  )   Nutrition Recommendations Other (specify)  (While patient on clear liquid diet suggest add Ensure clear BID   After test completed suggest advance diet to low fiber/residue  )

## 2017-10-25 NOTE — PROGRESS NOTES
[facility]  Progress Note - Pamela Caldwell 80 y o  female MRN: [id number]    [facility] Encounter: [id number]    Assessment/Plan:  1  Rectal bleeding/abdominal pain/rectal pain/diarrhea   -today she states she has not had any bowel movements however yesterday she did not abdominal pain and diarrhea  Today she denies any pain  -her hemoglobin is stable at 11 she is hemodynamically stable    -she was unable to tolerate the GoLYTELY bowel prep and this colonoscopy planned for today was moved tomorrow  We will try again with MiraLax prep this afternoon    -continue clear liquid diet today, NPO after midnight for colonoscopy tomorrow  Subjective:   Patient seen examined at bedside  She reports that she was unable to drink the GoLYTELY prep last night but she is willing to try the MiraLax prep today and would like to proceed with colonoscopy as planned tomorrow  She denies any new complaints  Objective:     Vitals: Blood pressure 151/70, pulse 87, temperature 98 9 °F (37 2 °C), temperature source Oral, resp  rate 18, height 5' 1" (1 549 m), weight 66 6 kg (146 lb 13 2 oz), SpO2 96 %  ,Body mass index is 27 74 kg/m²  Intake/Output Summary (Last 24 hours) at 10/25/17 1503  Last data filed at 10/25/17 0228   Gross per 24 hour   Intake          4297 92 ml   Output                0 ml   Net          4297 92 ml       Review of Systems: as per HPI  Review of Systems   Constitutional: Negative for activity change, appetite change, chills, fatigue, fever and unexpected weight change  HENT: Negative for mouth sores, sore throat and trouble swallowing  Respiratory: Negative for shortness of breath  Cardiovascular: Negative for chest pain  Gastrointestinal: Negative for abdominal distention, abdominal pain, blood in stool, constipation, diarrhea, nausea and vomiting  Skin: Negative for color change, pallor, rash and wound     Neurological: Negative for tremors and syncope  Physical Exam:     Physical Exam   Constitutional: She is oriented to person, place, and time  She appears well-developed and well-nourished  No distress  HENT:   Head: Normocephalic and atraumatic  Eyes: Right eye exhibits no discharge  Left eye exhibits no discharge  No scleral icterus  Neck: Neck supple  No tracheal deviation present  Cardiovascular: Normal rate, regular rhythm and normal heart sounds  Exam reveals no gallop  No murmur heard  Pulmonary/Chest: Effort normal and breath sounds normal  No respiratory distress  She has no wheezes  She has no rales  Abdominal: Soft  Bowel sounds are normal  She exhibits no distension and no mass  There is no tenderness  There is no rebound and no guarding  Neurological: She is alert and oriented to person, place, and time  Skin: Skin is warm and dry  Psychiatric: She has a normal mood and affect           Invasive Devices     Peripheral Intravenous Line            Peripheral IV 10/24/17 Right Antecubital 1 day                        CBC: Lab Results   Component Value Date    WBC 20 40 (H) 10/25/2017    HGB 11 0 (L) 10/25/2017    HCT 31 1 (L) 10/25/2017    MCV 91 10/25/2017     10/25/2017    MCH 32 1 10/25/2017    MCHC 35 4 10/25/2017    RDW 13 8 10/25/2017    MPV 8 7 (L) 10/25/2017    NRBC 0 10/25/2017   ,   CMP: Lab Results   Component Value Date     10/25/2017    K 2 8 (L) 10/25/2017     10/25/2017    CO2 22 10/25/2017    ANIONGAP 10 10/25/2017    BUN 2 (L) 10/25/2017    CREATININE 0 50 (L) 10/25/2017    GLUCOSE 96 10/25/2017    CALCIUM 8 2 (L) 10/25/2017    EGFR 91 10/25/2017
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
## 2017-10-25 NOTE — PROGRESS NOTES
Patient is having bowel prep starting at 8pm since it was ordered at 7pm, now is 10:46 pm and has only drank one cup of prep  I go in every few mins to remind her she needs to drink it and takes a small sip  Notified SOD  Spoke to Marlee

## 2017-10-25 NOTE — SOCIAL WORK
CSWS spoke with patient's son, Nelly Strong ([phone]) to complete SNF application  SNF application was sent via [facility] to [facility] and  as requested  Patients son provided a copy of patient's POA, this was placed in the Medical Records bin to be scanned into the EMR

## 2017-10-25 NOTE — SOCIAL WORK
Update from [facility]: [id number] and [facility] are willing to accept for STR  VIKTOR SHAH [facility] will call CM tomorrow morning

## 2017-10-25 NOTE — DISCHARGE SUMMARY
[facility] CENTR[facility] Discharge Summary - Chris Bello 80 y o  female MRN: [id number]    [address]  Room / Bed: [place] [address] [address]- Encounter: [id number]    BRIEF OVERVIEW    Admitting Provider: Adolfo Patton MD  Discharge Provider: Adolfo Patton MD  Primary Care Physician at Discharge: Adolfo Patton MD    [facility]  Admission Date: 10/21/2017     Discharge Date: 10/27/2017  2:03 [address] is an 60-year-old female with past medical history of hypothyroidism, hypertension, dementia presented to the emergency room for evaluation of lightheadedness and weakness associated with crampy abdominal pain  Patient was hypotensive upon arrival and EMS gave her 500 mL of fluid  In the emergency room, patient had large amount of melanotic stools  Vitals were stable  Except temperature was 94 4°  Patient was started on intravenous Zofran, Zosyn, normal saline infusion, and intravenous Protonix  Laboratory was significant for hemoglobin 12 8, leukocytosis, hyperglycemia and lactic acid was 2 7 on admission  Patient was admitted for evaluation of GI bleed and gastroenterology consult was placed  Patient was hemodynamically stable, gastroenterologist ecided to defer the colonoscopy if the patient continued to have more symptoms, such as continued bleeding  CT abdomen and pelvis with contrast showed thickening from the distal transverse colon to the anus possibly infectious or inflammatory colitis or ischemic bowel  Patient was placed NPO and continued on intravenous Zosyn, and Protonix  General surgery was also consulted and did not feel the need for urgent surgery at this point given the patient's symptoms were resolving  Stool culture, ova and parasites as well as C diff PCR was ordered   Colorectal surgery was also consulted and given the fact the patient was without access to food or water in [facility] they added intravenous Flagyl to cover anaerobes and discontinue Zosyn  Cultures for stool enteric bacteria, ova and parasites and C diff PCR came back negative  Lactic acid trended down to 2 1 and then down 0 8  Vascular celiac mesenteric duplex scan was also ordered  Hemoglobin was repeated every 6 hours and revealed anemia likely secondary to acute blood loss  PT/OT were consulted given the patient gait instability  Recommended inpatient rehab  Patient was her home meds for hypothyroidism and hypertension  Son was concern for the patient's mentation and geriatric consult was obtained  Montrial cognitive assessment was obtained and patient scored 19/30 classifying as mild dementia  Patient continued to have bloody bowel movements and GI decided to do a colonoscopy  Patient was unable to tolerate the GoLYTELY prep and the following day Miralax prep was ordered and patient tolerated the prep well  WBC trended up from 22 9 to 25  Patient was again colonoscopy and results revealed pseudomembranous colitis involving the colon, sigmoid colon and rectum, likely due to C difficile  Biopsies were obtained and patient were recommended to follow up for biopsy results  Patient was started on oral vancomycin 125 p o  Q 6 given her biopsies and leukocytosis  Patient recovered uneventfully and was stable for discharge to [facility] facility  Presenting Problem/History of Present Illness  Principal Problem:    GI bleed  Active Problems:    Near Syncope    Leukocytosis    Anemia, acute    Hyperglycemia    Lactic acid acidosis    Essential hypertension    Urinary retention    Hypothyroidism    Dementia    Colitis  Resolved Problems:    * No resolved hospital problems  *      Lower GI bleeding secondary to pseudomembranous colitis  CT of the abdomen and pelvis with intravenous contrast showed colonic wall thickening from the distal transverse colon to the anus  There was eukocytosis on admission    Trended up from 22 9-25  Enteric bacteria culture came back negative  Ova and parasite was negative  C diff PCR was negative   Lactic acid initially 3 7, trended down to 0 8  Patient was receiving continued intravenous hydration  VAS Celiac and mesenteric duplex was normal     Colonoscopy found pseudomembranous colitis, however C diff PCR was negative  Biopsies were taken to further evaluate  Patient was started on oral vancomycin to cover for C diff, pending biopsy results  Patient was stable on discharge with an oral diet      Leukocytosis  Secondary to above  Resolved today  Patient has been afebrile, nontoxic  On oral vancomycin to cover for C diff       Presyncope  Patient initially presented to the emergency room with hypotension and 500 cc was initiated  Resolved with intravenous fluids  Likely related to acute blood loss from lower GI bleed  Patient is out of bed with assistance   Currently asymptomatic  no dizziness  PT/OT evaluation says needs assistance with ADL and functional mobility   Recommend inpatient rehab  [facility] [place] operated by [facility] family want patient to go to an assisted living facility  Was medically stable for discharge to [facility]       Anemia  Likely secondary to acute blood loss      Hemoglobin has been stable 10-11  Blood pressure has been stable       Hypertension  Continue amlodipine and valsartan   Blood pressures been stable 110-050 systolic       Hypothyroidism  TSH 2 210  With follow-up for to 2-4 weeks as outpatient  Continue Synthroid       Hx of Primary Biliary Cirrhosis  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) - Patient scored 19/30 classifying as mild dementia  Outpatient follow-up with Gerontology        Hyperglycemia  Resolved  Blood sugar on admission was 223     UA shows glucosuria   Patient denies any polyuria, polyphagia, polydipsia  Patient does not have a diagnosis of diabetes  Patient's hemoglobin A1c was 5 6     Hypokalemia  Patient was having increased bouts of diarrhea  K+ was 2 8  Repleted with IVBP as well as oral K-dur  Follow-up BMP on discharge  Diagnostic Procedures Performed  Imaging Studies:  Ct Abdomen Pelvis W Contrast    Result Date: 10/21/2017  Impression: There is significant long segment colonic wall thickening from the distal transverse colon to the anus  Infectious or inflammatory colitis versus ischemic bowel to be considered  Appropriate clinical and lab work up suggested  There is also patchy diminished enhancement within both kidneys concerning for possible pyelonephritis  Correlate with urinalysis  There is a tiny gas bubble in the bladder lumen which might also indicate infection  VAS celiac and/or mesenteric duplex; complete study  Result Date: 10/24/2017  The aorta is patent and normal in caliber  The celiac, splenic and hepatic arteries are patent  The superior and inferior mesenteric arteries are normal and patent in a  fasting state             Pertinent Labs:      Results from last 7 days  Lab Units 10/27/17  0626   SODIUM mmol/L 137   POTASSIUM mmol/L 3 2*   CHLORIDE mmol/L 106   CO2 mmol/L 24   BUN mg/dL <1*   CREATININE mg/dL 0 51*   CALCIUM mg/dL 8 2*   GLUCOSE RANDOM mg/dL 93         Therapeutic Procedures Performed  Colonoscopy    Test Results Pending at Discharge:  [facility]    Medications     Medication List to be Continued at Discharge  Current Discharge Medication List      CONTINUE these medications which have NOT CHANGED    Details   amLODIPine (NORVASC) 5 mg tablet Take 5 mg by mouth daily      irbesartan (AVAPRO) 300 mg tablet Take 300 mg by mouth daily at bedtime      levothyroxine (SYNTHROID) 88 mcg tablet Take 88 mcg by mouth daily             Discharge Medication List as of 10/27/2017  1:40 PM      START taking these medications Details   vancomycin (VANCOCIN) 50mg/mL SOLN Take 2 5 mL by mouth every 6 (six) hours for 9 days, Starting Fri 10/27/2017, Until Sun 11/5/2017, Print               Allergies  No Known Allergies  Discharge Diet: cardiac diet  Activity restrictions: none  Discharge Condition: stable  Discharged With Lines: no    Discharge Disposition: Released to SNF/TCU/ [address] / Family Member Name: Ty Quintana  Phone Number: [phone]    Outpatient Follow-Up  yes      Follow up:  Lindsey Figueroa  Date and time:  Call for appointment  Follow up within next:  2 weeks      Follow up:  Dr Tasha Valdovinos  Date and time:  Call for appointment  Follow up within next:  Next 2 weeks          Code Status: Prior  Advance Directive and Living Will: <no information>  Power of :    POLST:      Discharge  Statement   I spent 30 minutes minutes discharging the patient  This time was spent on the day of discharge  I had direct contact with the patient on the day of discharge  Additional documentation is required if more than 30 minutes were spent on discharge

## 2017-10-26 ENCOUNTER — ANESTHESIA (INPATIENT)
Dept: GASTROENTEROLOGY | Facility: HOSPITAL | Age: 82
DRG: 372 | End: 2017-10-26
Payer: MEDICARE

## 2017-10-26 ENCOUNTER — GENERIC CONVERSION - ENCOUNTER (OUTPATIENT)
Dept: OTHER | Facility: OTHER | Age: 82
End: 2017-10-26

## 2017-10-26 LAB
HCT VFR BLD AUTO: 32.1 % (ref 34.8–46.1)
HGB BLD-MCNC: 11.2 G/DL (ref 11.5–15.4)
POTASSIUM SERPL-SCNC: 3.2 MMOL/L (ref 3.5–5.3)

## 2017-10-26 PROCEDURE — 0DBM8ZX EXCISION OF DESCENDING COLON, VIA NATURAL OR ARTIFICIAL OPENING ENDOSCOPIC, DIAGNOSTIC: ICD-10-PCS | Performed by: INTERNAL MEDICINE

## 2017-10-26 PROCEDURE — 0DBP8ZX EXCISION OF RECTUM, VIA NATURAL OR ARTIFICIAL OPENING ENDOSCOPIC, DIAGNOSTIC: ICD-10-PCS | Performed by: INTERNAL MEDICINE

## 2017-10-26 PROCEDURE — C9113 INJ PANTOPRAZOLE SODIUM, VIA: HCPCS | Performed by: SURGERY

## 2017-10-26 PROCEDURE — 85014 HEMATOCRIT: CPT | Performed by: INTERNAL MEDICINE

## 2017-10-26 PROCEDURE — 85018 HEMOGLOBIN: CPT | Performed by: INTERNAL MEDICINE

## 2017-10-26 PROCEDURE — 88305 TISSUE EXAM BY PATHOLOGIST: CPT | Performed by: INTERNAL MEDICINE

## 2017-10-26 PROCEDURE — 88342 IMHCHEM/IMCYTCHM 1ST ANTB: CPT | Performed by: INTERNAL MEDICINE

## 2017-10-26 PROCEDURE — 0DBN8ZX EXCISION OF SIGMOID COLON, VIA NATURAL OR ARTIFICIAL OPENING ENDOSCOPIC, DIAGNOSTIC: ICD-10-PCS | Performed by: INTERNAL MEDICINE

## 2017-10-26 PROCEDURE — 88341 IMHCHEM/IMCYTCHM EA ADD ANTB: CPT | Performed by: INTERNAL MEDICINE

## 2017-10-26 PROCEDURE — 84132 ASSAY OF SERUM POTASSIUM: CPT | Performed by: INTERNAL MEDICINE

## 2017-10-26 RX ORDER — PROPOFOL 10 MG/ML
INJECTION, EMULSION INTRAVENOUS CONTINUOUS PRN
Status: DISCONTINUED | OUTPATIENT
Start: 2017-10-26 | End: 2017-10-26 | Stop reason: SURG

## 2017-10-26 RX ORDER — POTASSIUM CHLORIDE 20 MEQ/1
40 TABLET, EXTENDED RELEASE ORAL ONCE
Status: COMPLETED | OUTPATIENT
Start: 2017-10-26 | End: 2017-10-26

## 2017-10-26 RX ORDER — PROPOFOL 10 MG/ML
INJECTION, EMULSION INTRAVENOUS AS NEEDED
Status: DISCONTINUED | OUTPATIENT
Start: 2017-10-26 | End: 2017-10-26 | Stop reason: SURG

## 2017-10-26 RX ORDER — SODIUM CHLORIDE, SODIUM LACTATE, POTASSIUM CHLORIDE, CALCIUM CHLORIDE 600; 310; 30; 20 MG/100ML; MG/100ML; MG/100ML; MG/100ML
125 INJECTION, SOLUTION INTRAVENOUS CONTINUOUS
Status: DISCONTINUED | OUTPATIENT
Start: 2017-10-26 | End: 2017-10-26

## 2017-10-26 RX ORDER — PANTOPRAZOLE SODIUM 40 MG/1
40 TABLET, DELAYED RELEASE ORAL
Status: DISCONTINUED | OUTPATIENT
Start: 2017-10-27 | End: 2017-10-27 | Stop reason: HOSPADM

## 2017-10-26 RX ORDER — SODIUM CHLORIDE 9 MG/ML
20 INJECTION, SOLUTION INTRAVENOUS CONTINUOUS
Status: CANCELLED | OUTPATIENT
Start: 2017-10-26

## 2017-10-26 RX ORDER — LIDOCAINE HYDROCHLORIDE 10 MG/ML
INJECTION, SOLUTION INFILTRATION; PERINEURAL AS NEEDED
Status: DISCONTINUED | OUTPATIENT
Start: 2017-10-26 | End: 2017-10-26 | Stop reason: SURG

## 2017-10-26 RX ORDER — SODIUM CHLORIDE 9 MG/ML
50 INJECTION, SOLUTION INTRAVENOUS CONTINUOUS
Status: DISCONTINUED | OUTPATIENT
Start: 2017-10-26 | End: 2017-10-27 | Stop reason: HOSPADM

## 2017-10-26 RX ORDER — ONDANSETRON 4 MG/1
4 TABLET, ORALLY DISINTEGRATING ORAL EVERY 6 HOURS PRN
Status: DISCONTINUED | OUTPATIENT
Start: 2017-10-26 | End: 2017-10-27 | Stop reason: HOSPADM

## 2017-10-26 RX ADMIN — VANCOMYCIN 125 MG: KIT at 11:47

## 2017-10-26 RX ADMIN — SODIUM CHLORIDE: 0.9 INJECTION, SOLUTION INTRAVENOUS at 08:20

## 2017-10-26 RX ADMIN — POTASSIUM CHLORIDE 40 MEQ: 1500 TABLET, EXTENDED RELEASE ORAL at 14:51

## 2017-10-26 RX ADMIN — VANCOMYCIN 125 MG: KIT at 23:19

## 2017-10-26 RX ADMIN — PROPOFOL 60 MG: 10 INJECTION, EMULSION INTRAVENOUS at 08:53

## 2017-10-26 RX ADMIN — VANCOMYCIN 125 MG: KIT at 17:42

## 2017-10-26 RX ADMIN — SODIUM CHLORIDE 50 ML/HR: 0.9 INJECTION, SOLUTION INTRAVENOUS at 16:47

## 2017-10-26 RX ADMIN — LEVOTHYROXINE SODIUM 88 MCG: 88 TABLET ORAL at 06:13

## 2017-10-26 RX ADMIN — PROPOFOL 100 MCG/KG/MIN: 10 INJECTION, EMULSION INTRAVENOUS at 08:53

## 2017-10-26 RX ADMIN — PANTOPRAZOLE SODIUM 40 MG: 40 INJECTION, POWDER, FOR SOLUTION INTRAVENOUS at 10:44

## 2017-10-26 RX ADMIN — AMLODIPINE BESYLATE 5 MG: 5 TABLET ORAL at 10:40

## 2017-10-26 RX ADMIN — SODIUM CHLORIDE 125 ML/HR: 0.9 INJECTION, SOLUTION INTRAVENOUS at 02:58

## 2017-10-26 RX ADMIN — ENOXAPARIN SODIUM 40 MG: 40 INJECTION SUBCUTANEOUS at 10:41

## 2017-10-26 RX ADMIN — SODIUM CHLORIDE 50 ML/HR: 0.9 INJECTION, SOLUTION INTRAVENOUS at 10:45

## 2017-10-26 RX ADMIN — LIDOCAINE HYDROCHLORIDE 50 MG: 10 INJECTION, SOLUTION INFILTRATION; PERINEURAL at 08:53

## 2017-10-26 RX ADMIN — VALSARTAN 320 MG: 160 TABLET ORAL at 10:40

## 2017-10-26 NOTE — SOCIAL WORK
CM received a call from [facility] and accepted bed at [facility] and she requested cm set up for wheel chair van to transport   [facility] aware all fees and agreeable   Cm set up pt with global for 2 pm on 10/27/17  [facility] aware , [facility] aware and nurse aware

## 2017-10-26 NOTE — OCCUPATIONAL THERAPY NOTE
Attempted to see pt for OT tx this AM however pt off the unit for procedure  Will f/u as schedule permits  Thank you       Placido Granger, OT

## 2017-10-26 NOTE — PROGRESS NOTES
IM Residency Progress Note   [facility]: [id number]  SOD Team B       Joie Mckeon 80 y o  female [address] Stay Days: 5      Assessment/Plan:    Principal Problem:    GI bleed  Active Problems:    Near Syncope    Leukocytosis    Anemia, acute    Hyperglycemia    Lactic acid acidosis    Essential hypertension    Urinary retention    Hypothyroidism    Dementia    Colitis    Lower GI bleeding secondary to Ischemic Colitis   Bleeding resolved  CT of the abdomen and pelvis with intravenous contrast showed colonic wall thickening from the distal transverse colon to the anus   Leukocytosis could be secondary to reactive inflammatory process, trending up  Antibiotics were discontinued- C diff was negative, enteric bacteria negative  Ova and parasite pending  Lactic acid initially 3 7, trended down  Continue intravenous normal saline  VAS Celiac and mesenteric duplex was normal     Patient currently on clear liquid diet  NPO after midnight  GI and Colorectal following     Colonoscopy is rescheduled for today  Patient prepped with Glycolax       Presyncope  Resolved  Likely related to acute blood loss from lower GI bleed  Patient is out of bed with assistance   Currently asymptomatic  no dizziness  PT/OT evaluation says needs assistance with ADL and functional mobility   Recommend short-term rehab  [facility] family want patient to go to an assisted living facility  Once medically stable will go to rehab        Anemia  Likely secondary to acute blood loss  Hemoglobin has been stable 11  Monitor CBC daily     Blood pressure has been stable   Monitor for hypotension       Hypertension  Continue amlodipine and valsartan  Blood pressures been stable 251-299 systolic       Hypothyroidism  TSH 2 210  With follow-up for to 2-4 weeks as outpatient  Continue Synthroid     Hx of Primary Biliary Cirrhosis  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 however is not currently on any regimen  Patient used to take Gailen Lowing some years ago, however not currently on this        Dementia   Montrial cognitive assessment today [facility]) - Patient scored 19/30 classifying as mild dementia  Outpatient follow-up with Gerontology           Hyperglycemia  Resolved  Blood sugar on admission was 223  UA shows glucosuria   Patient denies any polyuria, polyphagia, polydipsia  Patient does not have a diagnosis of diabetes  Patient's hemoglobin A1c was 5 6     Hypokalemia  Patient was having increased bouts of diarrhea  K+ was 2 8  Repleted with IVBP as well as oral K-dur         Disposition: Awaiting colonoscopy procedure today  [facility]           Subjective:   Patient seen and examined  Patient tolerated the GoLYTELY prep today  Patient awaiting colonoscopy in the morning  Vitals: Temp (24hrs), Av 7 °F (37 1 °C), Min:98 6 °F (37 °C), Max:98 9 °F (37 2 °C)  Current: Temperature: 98 7 °F (37 1 °C)  Vitals:    10/25/17 0500 10/25/17 0726 10/25/17 1500 10/25/17 2300   BP:  151/70 159/72 165/74   Pulse:  87 89 96   Resp:  18 16 16   Temp:  98 9 °F (37 2 °C) 98 6 °F (37 °C) 98 7 °F (37 1 °C)   TempSrc:  Oral Oral Oral   SpO2:  96% 97% 94%   Weight: 66 6 kg (146 lb 13 2 oz)      Height:        Body mass index is 27 74 kg/m²  I/O last 24 hours: In: 2369 2 [P O :250; I V :2119 2]  Out: -       Physical Exam: /74   Pulse 83   Temp 97 9 °F (36 6 °C) (Oral)   Resp 16   Ht 5' 1" (1 549 m)   Wt 66 2 kg (146 lb)   SpO2 95%   BMI 27 59 kg/m²   General appearance: alert, appears stated age and cooperative  Head: Normocephalic, without obvious abnormality, atraumatic  Lungs: clear to auscultation bilaterally  Heart: regular rate and rhythm, S1, S2 normal, no murmur, click, rub or gallop  Abdomen: Mild on the left lower quadrant only on deep palpation     Extremities: extremities normal, atraumatic, no cyanosis or edema  Pulses: 2+ and symmetric     Invasive Devices     Peripheral Intravenous Line            Peripheral IV 10/25/17 Left Antecubital less than 1 day                          Labs:   Recent Results (from the past 24 hour(s))   Potassium    Collection Time: 10/25/17 11:43 AM   Result Value Ref Range    Potassium 2 8 (L) 3 5 - 5 3 mmol/L       Radiology Results: I have personally reviewed pertinent reports  Other Diagnostic Testing:   I have personally reviewed pertinent reports  Active Meds:   Current Facility-Administered Medications   Medication Dose Route Frequency    amLODIPine (NORVASC) tablet 5 mg  5 mg Oral Daily    And    valsartan (DIOVAN) tablet 320 mg  320 mg Oral Daily    enoxaparin (LOVENOX) subcutaneous injection 40 mg  40 mg Subcutaneous Daily    lactated ringers infusion  125 mL/hr Intravenous Continuous    levothyroxine tablet 88 mcg  88 mcg Oral Early Morning    ondansetron (ZOFRAN) injection 4 mg  4 mg Intravenous Q6H PRN    pantoprazole (PROTONIX) injection 40 mg  40 mg Intravenous Q24H JOSSELIN    sodium chloride 0 9 % infusion  125 mL/hr Intravenous Continuous         VTE Pharmacologic Prophylaxis: Enoxaparin (Lovenox)  VTE Mechanical Prophylaxis: sequential compression device    LIBORIO Nix    Internal Medicine PGY-1  10/26/2017 8:30 AM  Pager [phone]

## 2017-10-26 NOTE — OP NOTE
**** GI/ENDOSCOPY REPORT ****     PATIENT NAME: Trinidad Cobian ------ VISIT ID:  Patient ID:   [id number] YOB: 1935     INTRODUCTION: Colonoscopy - A 80 female patient presents for an inpatient   Colonoscopy at [address] [place] [address]  PREVIOUS COLONOSCOPY: 1 year ago (repeating at a short interval because of   her symptoms)     INDICATIONS: Diarrhea  Abdominal pain  Rectal bleeding  Abnormal CT scan  CONSENT:  The benefits, risks, and alternatives to the procedure were   discussed and informed consent was obtained from the patient  PREPARATION: EKG, pulse, pulse oximetry and blood pressure were monitored   throughout the procedure  The patient was identified by myself both   verbally and by visual inspection of ID band  Airway Assessment   Classification: Airway class 2 - Visualization of the soft palate, fauces   and uvula  ASA Classification: Class 3 - Patient has severe systemic   disturbance that may or may not be related to the disorder requiring   surgery  MEDICATIONS: Anesthesia-check records     PROCEDURE:  The endoscope was passed without difficulty through the anus   under direct visualization and advanced to the cecum, confirmed by   appendiceal orifice and ileocecal valve  The scope was withdrawn and the   mucosa was carefully examined  The quality of the preparation was poor  Retroflexion was not performed due to colitis involving rectum  Cecal   Intubation Time: Minute(s) Scope Withdrawal Time: Minute(s)     RECTAL EXAM: Normal rectal exam      FINDINGS:  There was evidence of pseudomembranous colitis in the   descending colon, sigmoid colon, and rectum  Multiple biopsies was taken  COMPLICATIONS: There were no complications  IMPRESSIONS: Colitis found in the descending colon, sigmoid colon, and   rectum likely due to C diff  Multiple biopsies taken  RECOMMENDATIONS: Follow-up on the results of the biopsy specimens   Start vancomycin 125mg po Q6 hours until biopsy results return given her   findings and leukocytosis  ESTIMATED BLOOD LOSS:     PATHOLOGY SPECIMENS: Multiple biopsies taken  Associated finding: Colitis  PROCEDURE CODES:     ICD-9 Codes: 787 91 Diarrhea 789 00 Abdominal pain, unspecified site 569 3   Hemorrhage of rectum and anus 793 4 Nonspecific (abnormal) findings on   radiological and other examination of gastrointestinal tract 558 9 Other   and unspecified noninfectious gastroenteritides and colitis     ICD-10 Codes: R19 7 Diarrhea, unspecified R10 Abdominal and pelvic pain   K62 5 Hemorrhage of anus and rectum R93 3 Abnormal findings on diagnostic   imaging of other parts of digestive tract K52 9 Noninfective   gastroenteritis and colitis, unspecified     PERFORMED BY: LIBORIO Simpson  on 10/26/2017  Version 1, electronically signed by LIBORIO Reaves  on 10/26/2017   at 09:19

## 2017-10-26 NOTE — PLAN OF CARE
DISCHARGE PLANNING     Discharge to home or other facility with appropriate resources Progressing        DISCHARGE PLANNING - CARE MANAGEMENT     Discharge to post-acute care or home with appropriate resources Progressing        GASTROINTESTINAL - ADULT     Minimal or absence of nausea and/or vomiting Progressing     Maintains or returns to baseline bowel function Progressing     Maintains adequate nutritional intake Progressing        INFECTION - ADULT     Absence or prevention of progression during hospitalization Progressing        Knowledge Deficit     Patient/family/caregiver demonstrates understanding of disease process, treatment plan, medications, and discharge instructions Progressing        Nutrition/Hydration-ADULT     Nutrient/Hydration intake appropriate for improving, restoring or maintaining nutritional needs Progressing        PAIN - ADULT     Verbalizes/displays adequate comfort level or baseline comfort level Progressing        Potential for Falls     Patient will remain free of falls Progressing        SAFETY ADULT     Maintain or return to baseline ADL function Progressing     Maintain or return mobility status to optimal level Progressing

## 2017-10-26 NOTE — ANESTHESIA PREPROCEDURE EVALUATION
Patient Active Problem List   Diagnosis    Near Syncope    GI bleed    Leukocytosis    Anemia, acute    Hyperglycemia    Lactic acid acidosis    Essential hypertension    Urinary retention    Hypothyroidism    Dementia    Colitis       Review of Systems/Medical History  Patient summary reviewed  Chart reviewed  No history of anesthetic complications     Cardiovascular  Exercise tolerance: poor,  Hypertension ,    Pulmonary  Negative pulmonary ROS ,        GI/Hepatic      Comment: Colitis            Endo/Other  History of thyroid disease , hypothyroidism,      GYN       Hematology  Anemia ,     Musculoskeletal       Neurology   Psychology     Comment: Dementia           Physical Exam    Airway    Mallampati score: III  TM Distance: >3 FB  Neck ROM: full     Dental   Comment: Poor  ,     Cardiovascular  Cardiovascular exam normal    Pulmonary  Pulmonary exam normal     Other Findings        Anesthesia Plan  ASA Score- 3       Anesthesia Type- IV sedation with anesthesia with ASA Monitors  Additional Monitors:   Airway Plan:     Comment: I, Dr Derrick Garcia, the attending physician, has personally seen and evaluated the patient prior to anesthetic care  I have reviewed the pre-anesthetic record, and other medical records if appropriate to the anesthetic care  Risks and benefits discussed with patient; patient consented and agrees to proceed  If a CRNA is involved in the case, I have reviewed the CRNA assessment, if present, and agree  The patient is in a suitable condition to proceed with my formulated anesthetic plan          Induction- intravenous  Informed Consent- Anesthetic plan and risks discussed with patient

## 2017-10-26 NOTE — PHYSICAL THERAPY NOTE
Physical Therapy Cancellation Note    Pt at colonoscopy this am and upon attempt to see this pm pt stated she was too tired to participate with therapy  Will cancel and continue to follow    Dayami Chavarria, PTA

## 2017-10-26 NOTE — SOCIAL WORK
Cm received a call from Kiara Mac at [facility] and they are willing to accept when medically clear  Cm left voice message for sha [phone]  Alex awaiting return call

## 2017-10-26 NOTE — PROGRESS NOTES
Awaiting potassium results from blood work that was drawn this am  Floor called spoke to joann to verify it was drawn

## 2017-10-27 VITALS
TEMPERATURE: 97.7 F | RESPIRATION RATE: 18 BRPM | DIASTOLIC BLOOD PRESSURE: 67 MMHG | OXYGEN SATURATION: 96 % | SYSTOLIC BLOOD PRESSURE: 145 MMHG | HEART RATE: 79 BPM | HEIGHT: 61 IN | WEIGHT: 146 LBS | BODY MASS INDEX: 27.56 KG/M2

## 2017-10-27 LAB
ANION GAP SERPL CALCULATED.3IONS-SCNC: 7 MMOL/L (ref 4–13)
BUN SERPL-MCNC: <1 MG/DL (ref 5–25)
CALCIUM SERPL-MCNC: 8.2 MG/DL (ref 8.3–10.1)
CHLORIDE SERPL-SCNC: 106 MMOL/L (ref 100–108)
CO2 SERPL-SCNC: 24 MMOL/L (ref 21–32)
CREAT SERPL-MCNC: 0.51 MG/DL (ref 0.6–1.3)
ERYTHROCYTE [DISTWIDTH] IN BLOOD BY AUTOMATED COUNT: 14.1 % (ref 11.6–15.1)
GFR SERPL CREATININE-BSD FRML MDRD: 90 ML/MIN/1.73SQ M
GLUCOSE SERPL-MCNC: 93 MG/DL (ref 65–140)
HCT VFR BLD AUTO: 28.7 % (ref 34.8–46.1)
HGB BLD-MCNC: 10.3 G/DL (ref 11.5–15.4)
MCH RBC QN AUTO: 32.4 PG (ref 26.8–34.3)
MCHC RBC AUTO-ENTMCNC: 35.9 G/DL (ref 31.4–37.4)
MCV RBC AUTO: 90 FL (ref 82–98)
PLATELET # BLD AUTO: 266 THOUSANDS/UL (ref 149–390)
PMV BLD AUTO: 8.2 FL (ref 8.9–12.7)
POTASSIUM SERPL-SCNC: 3.2 MMOL/L (ref 3.5–5.3)
RBC # BLD AUTO: 3.18 MILLION/UL (ref 3.81–5.12)
SODIUM SERPL-SCNC: 137 MMOL/L (ref 136–145)
WBC # BLD AUTO: 7.74 THOUSAND/UL (ref 4.31–10.16)

## 2017-10-27 PROCEDURE — 80048 BASIC METABOLIC PNL TOTAL CA: CPT | Performed by: INTERNAL MEDICINE

## 2017-10-27 PROCEDURE — 85027 COMPLETE CBC AUTOMATED: CPT | Performed by: INTERNAL MEDICINE

## 2017-10-27 RX ORDER — POTASSIUM CHLORIDE 20 MEQ/1
40 TABLET, EXTENDED RELEASE ORAL ONCE
Status: COMPLETED | OUTPATIENT
Start: 2017-10-27 | End: 2017-10-27

## 2017-10-27 RX ADMIN — VALSARTAN 320 MG: 160 TABLET ORAL at 08:09

## 2017-10-27 RX ADMIN — VANCOMYCIN 125 MG: KIT at 11:01

## 2017-10-27 RX ADMIN — POTASSIUM CHLORIDE 40 MEQ: 1500 TABLET, EXTENDED RELEASE ORAL at 08:09

## 2017-10-27 RX ADMIN — ENOXAPARIN SODIUM 40 MG: 40 INJECTION SUBCUTANEOUS at 08:09

## 2017-10-27 RX ADMIN — VANCOMYCIN 125 MG: KIT at 06:26

## 2017-10-27 RX ADMIN — PANTOPRAZOLE SODIUM 40 MG: 40 TABLET, DELAYED RELEASE ORAL at 06:26

## 2017-10-27 RX ADMIN — AMLODIPINE BESYLATE 5 MG: 5 TABLET ORAL at 08:09

## 2017-10-27 RX ADMIN — LEVOTHYROXINE SODIUM 88 MCG: 88 TABLET ORAL at 06:26

## 2017-10-27 NOTE — DISCHARGE INSTR - AVS FIRST PAGE
Please follow up with your Gastroenterologist in 2-3 weeks  Follow up your colonoscopy biopsy results with your gastroenterologist    Can follow-up with Geriatrician as outpatient  Contact information provided  Follow up with PCP at [facility]

## 2017-10-27 NOTE — PROGRESS NOTES
IM Residency Progress Note   Unit/Bed#: [facility] 830-01 Encounter: [id number]  SOD Team B       Jay Gilmore 80 y o  female [address] Stay Days: 6      Assessment/Plan:    Principal Problem:    GI bleed  Active Problems:    Near Syncope    Leukocytosis    Anemia, acute    Hyperglycemia    Lactic acid acidosis    Essential hypertension    Urinary retention    Hypothyroidism    Dementia    Colitis    Lower GI bleeding secondary to pseudomembranous colitis  Bleeding resolved  CT of the abdomen and pelvis with intravenous contrast showed colonic wall thickening from the distal transverse colon to the anus   Leukocytosis could be secondary to infection likely C diff  Enteric bacteria negative  Ova and parasite pending  Lactic acid initially 3 7, trended down  Continue intravenous normal saline  VAS Celiac and mesenteric duplex was normal     Patient on regular diet today  Colonoscopy found pseudomembranous colitis, however C diff PCR was negative  Biopsies were taken to further evaluate  Patient was started on oral vancomycin to cover for C diff       Leukocytosis  Secondary to above  Resolved today  Patient has been afebrile, nontoxic  On oral vancomycin to cover for C diff  Presyncope  Resolved  Likely related to acute blood loss from lower GI bleed  Patient is out of bed with assistance   Currently asymptomatic  no dizziness  PT/OT evaluation says needs assistance with ADL and functional mobility   Recommend short-term rehab  Gretel Sukumar family want patient to go to an assisted living facility  Once medically stable will go to rehab        Anemia  Likely secondary to acute blood loss      Hemoglobin has been stable 10-11  Monitor CBC daily     Blood pressure has been stable   Monitor for hypotension       Hypertension  Continue amlodipine and valsartan  Blood pressures been stable 374-678 systolic       Hypothyroidism  TSH 2 210  With follow-up for to 2-4 weeks as outpatient  Continue Synthroid     Hx of Primary Biliary Cirrhosis  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 however is not currently on any regimen  Patient used to take Delia Crutch some years ago, however not currently on this        Dementia   Montrial cognitive assessment today [facility]) - Patient scored 19/30 classifying as mild dementia  Outpatient follow-up with Gerontology        Hyperglycemia  Resolved  Blood sugar on admission was 223  UA shows glucosuria   Patient denies any polyuria, polyphagia, polydipsia  Patient does not have a diagnosis of diabetes  Patient's hemoglobin A1c was 5 6     Hypokalemia  Patient was having increased bouts of diarrhea  K+ was 2 8  Repleted with IVBP as well as oral K-dur    Laith Rogers K today 3 2 will replete         Disposition:  Medically stable for discharge today  Subjective:   Patient seen and examined  Patient states that she slept particularly well last night  There was no overnight events reported by the nurses  She reports that she is not having any more diarrhea but having softer stools  She denies any nausea vomiting, abdominal pain  All other review of system was negative       Vitals: Temp (24hrs), Av 1 °F (36 7 °C), Min:97 7 °F (36 5 °C), Max:98 4 °F (36 9 °C)  Current: Temperature: 97 7 °F (36 5 °C)  Vitals:    10/26/17 0924 10/26/17 1500 10/26/17 2300 10/27/17 0700   BP: 134/81 126/57 143/64 145/67   Pulse: 90 88 87 79   Resp: 16 18 18 18   Temp:  98 3 °F (36 8 °C) 98 4 °F (36 9 °C) 97 7 °F (36 5 °C)   TempSrc:  Oral Oral Oral   SpO2: 99% 96% 95% 96%   Weight:       Height:        Body mass index is 27 59 kg/m²  I/O last 24 hours: In: 682 5 [P O :120;  I V :562 5]  Out: -       Physical Exam: /67   Pulse 79   Temp 97 7 °F (36 5 °C) (Oral)   Resp 18   Ht 5' 1" (1 549 m)   Wt 66 2 kg (146 lb)   SpO2 96%   BMI 27 59 kg/m²   General appearance: alert, appears stated age and cooperative  Lungs: clear to auscultation bilaterally  Heart: regular rate and rhythm, S1, S2 normal, no murmur, click, rub or gallop  Abdomen: soft, non-tender; bowel sounds normal; no masses,  no organomegaly  Pulses: 2+ and symmetric     Invasive Devices          No matching active lines, drains, or airways                    Labs:   Recent Results (from the past 24 hour(s))   Potassium    Collection Time: 10/26/17  8:14 AM   Result Value Ref Range    Potassium 3 2 (L) 3 5 - 5 3 mmol/L   Hemoglobin and hematocrit, blood    Collection Time: 10/26/17  8:14 AM   Result Value Ref Range    Hemoglobin 11 2 (L) 11 5 - 15 4 g/dL    Hematocrit 32 1 (L) 34 8 - 46 1 %   CBC    Collection Time: 10/27/17  6:26 AM   Result Value Ref Range    WBC 7 74 4 31 - 10 16 Thousand/uL    RBC 3 18 (L) 3 81 - 5 12 Million/uL    Hemoglobin 10 3 (L) 11 5 - 15 4 g/dL    Hematocrit 28 7 (L) 34 8 - 46 1 %    MCV 90 82 - 98 fL    MCH 32 4 26 8 - 34 3 pg    MCHC 35 9 31 4 - 37 4 g/dL    RDW 14 1 11 6 - 15 1 %    Platelets 503 364 - 965 Thousands/uL    MPV 8 2 (L) 8 9 - 12 7 fL   Basic metabolic panel    Collection Time: 10/27/17  6:26 AM   Result Value Ref Range    Sodium 137 136 - 145 mmol/L    Potassium 3 2 (L) 3 5 - 5 3 mmol/L    Chloride 106 100 - 108 mmol/L    CO2 24 21 - 32 mmol/L    Anion Gap 7 4 - 13 mmol/L    BUN <1 (L) 5 - 25 mg/dL    Creatinine 0 51 (L) 0 60 - 1 30 mg/dL    Glucose 93 65 - 140 mg/dL    Calcium 8 2 (L) 8 3 - 10 1 mg/dL    eGFR 90 ml/min/1 73sq m       Radiology Results: I have personally reviewed pertinent reports  Other Diagnostic Testing:   I have personally reviewed pertinent reports          Active Meds:   Current Facility-Administered Medications   Medication Dose Route Frequency    amLODIPine (NORVASC) tablet 5 mg  5 mg Oral Daily    And    valsartan (DIOVAN) tablet 320 mg  320 mg Oral Daily    enoxaparin (LOVENOX) subcutaneous injection 40 mg  40 mg Subcutaneous Daily    levothyroxine tablet 88 mcg  88 mcg Oral Early Morning    ondansetron (ZOFRAN-ODT) dispersible tablet 4 mg  4 mg Oral Q6H PRN    pantoprazole (PROTONIX) EC tablet 40 mg  40 mg Oral Early Morning    potassium chloride (K-DUR,KLOR-CON) CR tablet 40 mEq  40 mEq Oral Once    sodium chloride 0 9 % infusion  50 mL/hr Intravenous Continuous    vancomycin (VANCOCIN) oral solution 125 mg  125 mg Oral Q6H [address]         VTE Pharmacologic Prophylaxis: Enoxaparin (Lovenox)  VTE Mechanical Prophylaxis: sequential compression device    Kwasi Arce MD

## 2017-10-28 LAB — O+P STL CONC: NORMAL

## 2017-11-01 ENCOUNTER — GENERIC CONVERSION - ENCOUNTER (OUTPATIENT)
Dept: OTHER | Facility: OTHER | Age: 82
End: 2017-11-01

## 2017-11-10 ENCOUNTER — GENERIC CONVERSION - ENCOUNTER (OUTPATIENT)
Dept: OTHER | Facility: OTHER | Age: 82
End: 2017-11-10

## 2018-01-11 NOTE — MISCELLANEOUS
Dear Jeffery Gillespie,    [address] [facility] office has attempted to contact you regarding your results  Please contact the office at [phone]      Thank you,    [facility]      Electronically signed by:Stormy Stovall   Nov 10 2017  1:58PM EST

## 2018-01-14 NOTE — RESULT NOTES
Discussion/Summary   C difficile colitis was confirmed on the biopsies  Complete course of antbiotics  Verified Results  (1) TISSUE EXAM [id number] 09:00AM Hever Odell     Test Name Result Flag Reference   LAB AP CASE REPORT (Report)     Surgical Pathology Report             Case: [id number]                   Authorizing Provider: Agatha Koenig MD      Collected:      10/26/2017 0900        Ordering Location:   [address] [place] [address]   Received:      10/26/2017 [facility]                               Pathologist:      Patience Junior MD                                Specimen:  Colon, cold biopsy r/o c diff colitis   LAB AP FINAL DIAGNOSIS (Report)     A  Colon, cold biopsy r/o c diff colitis:  -Severe active colitis with marked acute & chronic inflammation of lamina   propria, with necrosis  -Extensive mucosal ulceration , mucopurulent exudate with formation of   necrotic mushroom-like membrane   Separate fragments of fibrinous material associated with acute   inflammatory cells present  Note:   These features are consistent with Pseudo membranes colitis/ Clostridium   difficile colitis   Other differential diagnosis include drug induced   colitis   Clinical & endoscopic correlation with appropriate serological   tests is recommended  Electronically signed by Patience Junior MD on 10/30/2017 at 1:13 PM   LAB AP NOTE      Interpretation performed at [facility], [address] [place], [place] [id number]   LAB [address] [place] [address] (Report)     All controls performed with the immunohistochemical stains reported above   reacted appropriately  These tests were developed and their performance   characteristics determined by [address] [place] [address] or   [facility]  They may not be cleared or approved by the [place]  Food and Drug Administration  The FDA has determined that such clearance   or approval is not necessary   These tests are used for clinical purposes  They should not be regarded as investigational or for research  This   laboratory has been approved by CLIA 88, designated as a high-complexity   laboratory and is qualified to perform these tests  LAB AP GROSS DESCRIPTION (Report)     A  The specimen is received in formalin, labeled with the patient's name   and hospital number, and is designated colon biopsy  The specimen   consists of multiple tan-pink soft tissue fragments measuring in aggregate   0 6 x 0 5 x 0 2 cm  Entirely submitted  One cassette  Note: The estimated total formalin fixation time based upon information   provided by the submitting clinician and the standard processing schedule   is under 72 hours  MAS   LAB AP ADDENDUM 1      -No viral organisms seen on CMV, adenovirus stains   Controls reacted   appropriately  Addendum electronically signed by Alanna Hernandez MD on 10/31/2017 at 11:19 AM

## 2024-09-30 NOTE — PROGRESS NOTES
Progress Note - Mira Mahaajn 80 y o  female MRN: [id number]    Unit/Bed#: [facility] 830-01 Encounter: [id number]      Assessment:  Patient is an 42-year-old female from [facility] who presents with a GI bleed    Plan:  Clears  F/u stool cx and tests once collected  F/u mesenteric duplex  Abdominal pain and bleeding resolving, Hg stable, hold off on colonoscopy at this point  IV flagyl  Primary care per internal medicine    Subjective:   Pt feels well  Abdominal pain resolved, only mild on deep palpation  No N/V  No more bowel movements  Nursing says she had small amount of rectal discharge which was red  Objective:     Vitals: Blood pressure 146/82, pulse 77, temperature 98 2 °F (36 8 °C), temperature source Oral, resp  rate 18, height 5' 1" (1 549 m), weight 62 7 kg (138 lb 3 7 oz), SpO2 97 %  ,Body mass index is 26 12 kg/m²  Intake/Output Summary (Last 24 hours) at 10/23/17 1057  Last data filed at 10/23/17 1041   Gross per 24 hour   Intake          4066 24 ml   Output             1300 ml   Net          2766 24 ml       Physical Exam: awake, alert  NAD  RRR  Normal respiratory effort  Soft, TTP lower abdomen, ND  No c/c/e    Invasive Devices     Peripheral Intravenous Line            Peripheral IV 10/21/17 Left Antecubital 1 day                Lab, Imaging and other studies: I have personally reviewed pertinent reports      VTE Mechanical Prophylaxis: sequential compression device Spouse/Significant other